# Patient Record
Sex: MALE | Race: WHITE | NOT HISPANIC OR LATINO
[De-identification: names, ages, dates, MRNs, and addresses within clinical notes are randomized per-mention and may not be internally consistent; named-entity substitution may affect disease eponyms.]

---

## 2017-02-23 ENCOUNTER — APPOINTMENT (OUTPATIENT)
Dept: INTERNAL MEDICINE | Facility: CLINIC | Age: 67
End: 2017-02-23

## 2017-02-23 VITALS
HEIGHT: 68 IN | SYSTOLIC BLOOD PRESSURE: 112 MMHG | BODY MASS INDEX: 29.1 KG/M2 | WEIGHT: 192 LBS | OXYGEN SATURATION: 98 % | TEMPERATURE: 98.3 F | HEART RATE: 62 BPM | DIASTOLIC BLOOD PRESSURE: 70 MMHG

## 2017-02-23 DIAGNOSIS — R10.9 UNSPECIFIED ABDOMINAL PAIN: ICD-10-CM

## 2017-03-02 ENCOUNTER — CLINICAL ADVICE (OUTPATIENT)
Age: 67
End: 2017-03-02

## 2017-03-02 LAB
ALBUMIN SERPL ELPH-MCNC: 4.2 G/DL
ALP BLD-CCNC: 68 U/L
ALT SERPL-CCNC: 20 U/L
AMYLASE/CREAT SERPL: 67 U/L
ANION GAP SERPL CALC-SCNC: 16 MMOL/L
AST SERPL-CCNC: 19 U/L
BASOPHILS # BLD AUTO: 0.02 K/UL
BASOPHILS NFR BLD AUTO: 0.3 %
BILIRUB SERPL-MCNC: 0.5 MG/DL
BUN SERPL-MCNC: 12 MG/DL
CALCIUM SERPL-MCNC: 9.5 MG/DL
CHLORIDE SERPL-SCNC: 98 MMOL/L
CO2 SERPL-SCNC: 24 MMOL/L
CREAT SERPL-MCNC: 0.96 MG/DL
EOSINOPHIL # BLD AUTO: 0.11 K/UL
EOSINOPHIL NFR BLD AUTO: 1.6 %
GLUCOSE SERPL-MCNC: 101 MG/DL
HCT VFR BLD CALC: 41.2 %
HGB BLD-MCNC: 13.3 G/DL
IMM GRANULOCYTES NFR BLD AUTO: 0.1 %
LDH SERPL-CCNC: 255 U/L
LYMPHOCYTES # BLD AUTO: 1.98 K/UL
LYMPHOCYTES NFR BLD AUTO: 28 %
MAN DIFF?: NORMAL
MCHC RBC-ENTMCNC: 29.3 PG
MCHC RBC-ENTMCNC: 32.3 GM/DL
MCV RBC AUTO: 90.7 FL
MONOCYTES # BLD AUTO: 0.47 K/UL
MONOCYTES NFR BLD AUTO: 6.6 %
NEUTROPHILS # BLD AUTO: 4.49 K/UL
NEUTROPHILS NFR BLD AUTO: 63.4 %
PLATELET # BLD AUTO: 257 K/UL
POTASSIUM SERPL-SCNC: 4.5 MMOL/L
PROT SERPL-MCNC: 7.3 G/DL
RBC # BLD: 4.54 M/UL
RBC # FLD: 13.5 %
SODIUM SERPL-SCNC: 138 MMOL/L
WBC # FLD AUTO: 7.08 K/UL

## 2017-03-13 ENCOUNTER — RX RENEWAL (OUTPATIENT)
Age: 67
End: 2017-03-13

## 2017-07-20 ENCOUNTER — APPOINTMENT (OUTPATIENT)
Dept: INTERNAL MEDICINE | Facility: CLINIC | Age: 67
End: 2017-07-20

## 2017-07-20 ENCOUNTER — NON-APPOINTMENT (OUTPATIENT)
Age: 67
End: 2017-07-20

## 2017-07-20 VITALS
HEIGHT: 68 IN | TEMPERATURE: 98 F | SYSTOLIC BLOOD PRESSURE: 120 MMHG | WEIGHT: 185 LBS | BODY MASS INDEX: 28.04 KG/M2 | OXYGEN SATURATION: 99 % | DIASTOLIC BLOOD PRESSURE: 70 MMHG | HEART RATE: 89 BPM

## 2017-07-20 DIAGNOSIS — Z01.818 ENCOUNTER FOR OTHER PREPROCEDURAL EXAMINATION: ICD-10-CM

## 2017-07-20 RX ORDER — CEPHALEXIN 500 MG/1
500 CAPSULE ORAL
Qty: 15 | Refills: 0 | Status: DISCONTINUED | COMMUNITY
Start: 2017-07-07

## 2017-07-20 RX ORDER — CLOTRIMAZOLE AND BETAMETHASONE DIPROPIONATE 10; .5 MG/G; MG/G
1-0.05 CREAM TOPICAL
Qty: 15 | Refills: 0 | Status: DISCONTINUED | COMMUNITY
Start: 2017-07-07 | End: 2017-07-20

## 2017-07-20 RX ORDER — CHLORHEXIDINE GLUCONATE, 0.12% ORAL RINSE 1.2 MG/ML
0.12 SOLUTION DENTAL
Qty: 473 | Refills: 0 | Status: DISCONTINUED | COMMUNITY
Start: 2017-05-08 | End: 2017-07-20

## 2017-07-21 ENCOUNTER — APPOINTMENT (OUTPATIENT)
Dept: CARDIOLOGY | Facility: CLINIC | Age: 67
End: 2017-07-21

## 2017-07-21 ENCOUNTER — NON-APPOINTMENT (OUTPATIENT)
Age: 67
End: 2017-07-21

## 2017-07-21 VITALS
BODY MASS INDEX: 28.28 KG/M2 | TEMPERATURE: 97.7 F | HEIGHT: 68 IN | OXYGEN SATURATION: 97 % | WEIGHT: 186.6 LBS | DIASTOLIC BLOOD PRESSURE: 74 MMHG | HEART RATE: 83 BPM | SYSTOLIC BLOOD PRESSURE: 131 MMHG

## 2017-07-21 DIAGNOSIS — H26.9 UNSPECIFIED CATARACT: ICD-10-CM

## 2017-07-24 ENCOUNTER — RX RENEWAL (OUTPATIENT)
Age: 67
End: 2017-07-24

## 2017-08-18 ENCOUNTER — APPOINTMENT (OUTPATIENT)
Dept: CARDIOLOGY | Facility: CLINIC | Age: 67
End: 2017-08-18
Payer: COMMERCIAL

## 2017-08-18 VITALS
SYSTOLIC BLOOD PRESSURE: 140 MMHG | BODY MASS INDEX: 28.04 KG/M2 | HEART RATE: 66 BPM | OXYGEN SATURATION: 98 % | WEIGHT: 185 LBS | DIASTOLIC BLOOD PRESSURE: 80 MMHG | HEIGHT: 68 IN

## 2017-08-18 PROCEDURE — 93306 TTE W/DOPPLER COMPLETE: CPT

## 2017-08-18 PROCEDURE — 93015 CV STRESS TEST SUPVJ I&R: CPT

## 2017-08-18 PROCEDURE — 99214 OFFICE O/P EST MOD 30 MIN: CPT | Mod: 25

## 2017-09-26 ENCOUNTER — APPOINTMENT (OUTPATIENT)
Dept: CARDIOLOGY | Facility: CLINIC | Age: 67
End: 2017-09-26
Payer: COMMERCIAL

## 2017-09-26 ENCOUNTER — NON-APPOINTMENT (OUTPATIENT)
Age: 67
End: 2017-09-26

## 2017-09-26 VITALS
DIASTOLIC BLOOD PRESSURE: 74 MMHG | HEART RATE: 63 BPM | OXYGEN SATURATION: 99 % | BODY MASS INDEX: 28.34 KG/M2 | WEIGHT: 187 LBS | HEIGHT: 68 IN | SYSTOLIC BLOOD PRESSURE: 127 MMHG | TEMPERATURE: 97.4 F

## 2017-09-26 PROCEDURE — 93000 ELECTROCARDIOGRAM COMPLETE: CPT

## 2017-09-26 PROCEDURE — 36415 COLL VENOUS BLD VENIPUNCTURE: CPT

## 2017-09-26 PROCEDURE — 99214 OFFICE O/P EST MOD 30 MIN: CPT

## 2017-09-29 LAB
ALBUMIN SERPL ELPH-MCNC: 4.3 G/DL
ALP BLD-CCNC: 68 U/L
ALT SERPL-CCNC: 18 U/L
ANION GAP SERPL CALC-SCNC: 23 MMOL/L
AST SERPL-CCNC: 23 U/L
BASOPHILS # BLD AUTO: 0.02 K/UL
BASOPHILS NFR BLD AUTO: 0.3 %
BILIRUB SERPL-MCNC: 0.5 MG/DL
BUN SERPL-MCNC: 16 MG/DL
CALCIUM SERPL-MCNC: 9.6 MG/DL
CHLORIDE SERPL-SCNC: 100 MMOL/L
CHOLEST SERPL-MCNC: 207 MG/DL
CHOLEST/HDLC SERPL: 3 RATIO
CO2 SERPL-SCNC: 23 MMOL/L
CREAT SERPL-MCNC: 0.85 MG/DL
EOSINOPHIL # BLD AUTO: 0.14 K/UL
EOSINOPHIL NFR BLD AUTO: 2.1 %
GLUCOSE SERPL-MCNC: 90 MG/DL
HCT VFR BLD CALC: 41.1 %
HDLC SERPL-MCNC: 68 MG/DL
HGB BLD-MCNC: 13.3 G/DL
IMM GRANULOCYTES NFR BLD AUTO: 0.1 %
LDLC SERPL CALC-MCNC: 124 MG/DL
LYMPHOCYTES # BLD AUTO: 1.41 K/UL
LYMPHOCYTES NFR BLD AUTO: 20.8 %
MAN DIFF?: NORMAL
MCHC RBC-ENTMCNC: 29.5 PG
MCHC RBC-ENTMCNC: 32.4 GM/DL
MCV RBC AUTO: 91.1 FL
MONOCYTES # BLD AUTO: 0.54 K/UL
MONOCYTES NFR BLD AUTO: 8 %
NEUTROPHILS # BLD AUTO: 4.66 K/UL
NEUTROPHILS NFR BLD AUTO: 68.7 %
PLATELET # BLD AUTO: 233 K/UL
POTASSIUM SERPL-SCNC: 4.5 MMOL/L
PROT SERPL-MCNC: 7.7 G/DL
RBC # BLD: 4.51 M/UL
RBC # FLD: 14.2 %
SODIUM SERPL-SCNC: 146 MMOL/L
TRIGL SERPL-MCNC: 76 MG/DL
WBC # FLD AUTO: 6.78 K/UL

## 2017-10-15 ENCOUNTER — RX RENEWAL (OUTPATIENT)
Age: 67
End: 2017-10-15

## 2017-11-17 ENCOUNTER — RX RENEWAL (OUTPATIENT)
Age: 67
End: 2017-11-17

## 2017-11-19 ENCOUNTER — RX RENEWAL (OUTPATIENT)
Age: 67
End: 2017-11-19

## 2017-12-01 ENCOUNTER — APPOINTMENT (OUTPATIENT)
Dept: CARDIOLOGY | Facility: CLINIC | Age: 67
End: 2017-12-01
Payer: COMMERCIAL

## 2017-12-01 ENCOUNTER — NON-APPOINTMENT (OUTPATIENT)
Age: 67
End: 2017-12-01

## 2017-12-01 VITALS
TEMPERATURE: 97.5 F | HEART RATE: 70 BPM | DIASTOLIC BLOOD PRESSURE: 69 MMHG | HEIGHT: 68 IN | SYSTOLIC BLOOD PRESSURE: 122 MMHG | OXYGEN SATURATION: 98 % | WEIGHT: 189 LBS | BODY MASS INDEX: 28.64 KG/M2

## 2017-12-01 DIAGNOSIS — R73.09 OTHER ABNORMAL GLUCOSE: ICD-10-CM

## 2017-12-01 PROCEDURE — 99214 OFFICE O/P EST MOD 30 MIN: CPT

## 2017-12-01 PROCEDURE — 93000 ELECTROCARDIOGRAM COMPLETE: CPT

## 2017-12-01 PROCEDURE — 36415 COLL VENOUS BLD VENIPUNCTURE: CPT

## 2017-12-03 ENCOUNTER — NON-APPOINTMENT (OUTPATIENT)
Age: 67
End: 2017-12-03

## 2017-12-04 ENCOUNTER — LABORATORY RESULT (OUTPATIENT)
Age: 67
End: 2017-12-04

## 2017-12-05 ENCOUNTER — APPOINTMENT (OUTPATIENT)
Dept: INTERNAL MEDICINE | Facility: CLINIC | Age: 67
End: 2017-12-05
Payer: COMMERCIAL

## 2017-12-05 VITALS
HEART RATE: 63 BPM | WEIGHT: 189 LBS | DIASTOLIC BLOOD PRESSURE: 60 MMHG | BODY MASS INDEX: 28.64 KG/M2 | OXYGEN SATURATION: 99 % | TEMPERATURE: 97.9 F | HEIGHT: 68 IN | SYSTOLIC BLOOD PRESSURE: 100 MMHG

## 2017-12-05 DIAGNOSIS — N52.9 MALE ERECTILE DYSFUNCTION, UNSPECIFIED: ICD-10-CM

## 2017-12-05 LAB
ALBUMIN SERPL ELPH-MCNC: 4.3 G/DL
ALP BLD-CCNC: 71 U/L
ALT SERPL-CCNC: 21 U/L
ANION GAP SERPL CALC-SCNC: 12 MMOL/L
AST SERPL-CCNC: 20 U/L
BASOPHILS # BLD AUTO: 0.02 K/UL
BASOPHILS NFR BLD AUTO: 0.4 %
BILIRUB SERPL-MCNC: 0.5 MG/DL
BUN SERPL-MCNC: 15 MG/DL
CALCIUM SERPL-MCNC: 9.7 MG/DL
CHLORIDE SERPL-SCNC: 100 MMOL/L
CHOLEST SERPL-MCNC: 214 MG/DL
CHOLEST/HDLC SERPL: 3 RATIO
CO2 SERPL-SCNC: 28 MMOL/L
CREAT SERPL-MCNC: 1.02 MG/DL
EOSINOPHIL # BLD AUTO: 0.17 K/UL
EOSINOPHIL NFR BLD AUTO: 3.2 %
GLUCOSE SERPL-MCNC: 84 MG/DL
HBA1C MFR BLD HPLC: 5.6 %
HCT VFR BLD CALC: 40.5 %
HDLC SERPL-MCNC: 72 MG/DL
HGB BLD-MCNC: 13.3 G/DL
IMM GRANULOCYTES NFR BLD AUTO: 0.2 %
LDLC SERPL CALC-MCNC: 127 MG/DL
LYMPHOCYTES # BLD AUTO: 1.69 K/UL
LYMPHOCYTES NFR BLD AUTO: 31.9 %
MAN DIFF?: NORMAL
MCHC RBC-ENTMCNC: 29.8 PG
MCHC RBC-ENTMCNC: 32.8 GM/DL
MCV RBC AUTO: 90.8 FL
MONOCYTES # BLD AUTO: 0.51 K/UL
MONOCYTES NFR BLD AUTO: 9.6 %
NEUTROPHILS # BLD AUTO: 2.89 K/UL
NEUTROPHILS NFR BLD AUTO: 54.7 %
PLATELET # BLD AUTO: 229 K/UL
POTASSIUM SERPL-SCNC: 5 MMOL/L
PROT SERPL-MCNC: 7.5 G/DL
RBC # BLD: 4.46 M/UL
RBC # FLD: 13.6 %
SODIUM SERPL-SCNC: 140 MMOL/L
TRIGL SERPL-MCNC: 75 MG/DL
WBC # FLD AUTO: 5.29 K/UL

## 2017-12-05 PROCEDURE — 99213 OFFICE O/P EST LOW 20 MIN: CPT | Mod: 25

## 2017-12-05 PROCEDURE — 90662 IIV NO PRSV INCREASED AG IM: CPT

## 2017-12-05 PROCEDURE — G0008: CPT

## 2017-12-05 PROCEDURE — G0439: CPT

## 2017-12-05 PROCEDURE — 36415 COLL VENOUS BLD VENIPUNCTURE: CPT

## 2017-12-07 LAB
ALBUMIN SERPL ELPH-MCNC: 4.4 G/DL
ALP BLD-CCNC: 68 U/L
ALT SERPL-CCNC: 22 U/L
ANION GAP SERPL CALC-SCNC: 16 MMOL/L
APPEARANCE: CLEAR
AST SERPL-CCNC: 24 U/L
BACTERIA: NEGATIVE
BILIRUB SERPL-MCNC: 0.4 MG/DL
BILIRUBIN URINE: NEGATIVE
BLOOD URINE: NEGATIVE
BUN SERPL-MCNC: 14 MG/DL
CALCIUM SERPL-MCNC: 9.7 MG/DL
CHLORIDE SERPL-SCNC: 96 MMOL/L
CHOLEST SERPL-MCNC: 196 MG/DL
CHOLEST/HDLC SERPL: 3.3 RATIO
CO2 SERPL-SCNC: 26 MMOL/L
COLOR: YELLOW
CREAT SERPL-MCNC: 0.85 MG/DL
GLUCOSE QUALITATIVE U: NEGATIVE MG/DL
GLUCOSE SERPL-MCNC: 105 MG/DL
HDLC SERPL-MCNC: 60 MG/DL
KETONES URINE: NEGATIVE
LDLC SERPL CALC-MCNC: 120 MG/DL
LEUKOCYTE ESTERASE URINE: NEGATIVE
MICROSCOPIC-UA: NORMAL
NITRITE URINE: NEGATIVE
PH URINE: 7.5
POTASSIUM SERPL-SCNC: 4.8 MMOL/L
PROT SERPL-MCNC: 7.9 G/DL
PROTEIN URINE: NEGATIVE MG/DL
PSA SERPL-MCNC: 2.69 NG/ML
RED BLOOD CELLS URINE: 1 /HPF
SODIUM SERPL-SCNC: 138 MMOL/L
SPECIFIC GRAVITY URINE: 1.01
SQUAMOUS EPITHELIAL CELLS: 0 /HPF
T3RU NFR SERPL: 1.1 INDEX
T4 SERPL-MCNC: 7.4 UG/DL
TRIGL SERPL-MCNC: 81 MG/DL
TSH SERPL-ACNC: 3.53 UIU/ML
UROBILINOGEN URINE: NEGATIVE MG/DL
WHITE BLOOD CELLS URINE: 0 /HPF

## 2017-12-18 ENCOUNTER — RX RENEWAL (OUTPATIENT)
Age: 67
End: 2017-12-18

## 2018-01-13 ENCOUNTER — RX RENEWAL (OUTPATIENT)
Age: 68
End: 2018-01-13

## 2018-01-15 ENCOUNTER — RX RENEWAL (OUTPATIENT)
Age: 68
End: 2018-01-15

## 2018-02-21 ENCOUNTER — RX RENEWAL (OUTPATIENT)
Age: 68
End: 2018-02-21

## 2018-02-23 ENCOUNTER — RX RENEWAL (OUTPATIENT)
Age: 68
End: 2018-02-23

## 2018-03-20 ENCOUNTER — RX RENEWAL (OUTPATIENT)
Age: 68
End: 2018-03-20

## 2018-04-16 ENCOUNTER — RX RENEWAL (OUTPATIENT)
Age: 68
End: 2018-04-16

## 2018-05-16 ENCOUNTER — RX RENEWAL (OUTPATIENT)
Age: 68
End: 2018-05-16

## 2018-06-01 ENCOUNTER — NON-APPOINTMENT (OUTPATIENT)
Age: 68
End: 2018-06-01

## 2018-06-01 ENCOUNTER — APPOINTMENT (OUTPATIENT)
Dept: CARDIOLOGY | Facility: CLINIC | Age: 68
End: 2018-06-01
Payer: COMMERCIAL

## 2018-06-01 VITALS
WEIGHT: 190 LBS | BODY MASS INDEX: 28.79 KG/M2 | HEART RATE: 60 BPM | TEMPERATURE: 98.3 F | OXYGEN SATURATION: 98 % | SYSTOLIC BLOOD PRESSURE: 134 MMHG | HEIGHT: 68 IN | DIASTOLIC BLOOD PRESSURE: 71 MMHG

## 2018-06-01 VITALS
DIASTOLIC BLOOD PRESSURE: 70 MMHG | BODY MASS INDEX: 29.01 KG/M2 | HEART RATE: 52 BPM | WEIGHT: 190.8 LBS | SYSTOLIC BLOOD PRESSURE: 140 MMHG

## 2018-06-01 PROCEDURE — 93000 ELECTROCARDIOGRAM COMPLETE: CPT

## 2018-06-01 PROCEDURE — 36415 COLL VENOUS BLD VENIPUNCTURE: CPT

## 2018-06-01 PROCEDURE — 99214 OFFICE O/P EST MOD 30 MIN: CPT

## 2018-06-06 LAB
ALBUMIN SERPL ELPH-MCNC: 4.5 G/DL
ALP BLD-CCNC: 76 U/L
ALT SERPL-CCNC: 23 U/L
ANION GAP SERPL CALC-SCNC: 10 MMOL/L
AST SERPL-CCNC: 20 U/L
BASOPHILS # BLD AUTO: 0.02 K/UL
BASOPHILS NFR BLD AUTO: 0.4 %
BILIRUB SERPL-MCNC: 0.3 MG/DL
BUN SERPL-MCNC: 20 MG/DL
CALCIUM SERPL-MCNC: 9.6 MG/DL
CHLORIDE SERPL-SCNC: 101 MMOL/L
CHOLEST SERPL-MCNC: 183 MG/DL
CHOLEST/HDLC SERPL: 3.3 RATIO
CO2 SERPL-SCNC: 28 MMOL/L
CREAT SERPL-MCNC: 1.03 MG/DL
EOSINOPHIL # BLD AUTO: 0.09 K/UL
EOSINOPHIL NFR BLD AUTO: 1.6 %
GLUCOSE SERPL-MCNC: 97 MG/DL
HBA1C MFR BLD HPLC: 5.4 %
HCT VFR BLD CALC: 41.3 %
HDLC SERPL-MCNC: 56 MG/DL
HGB BLD-MCNC: 13.5 G/DL
IMM GRANULOCYTES NFR BLD AUTO: 0.2 %
LDLC SERPL CALC-MCNC: 106 MG/DL
LYMPHOCYTES # BLD AUTO: 1.7 K/UL
LYMPHOCYTES NFR BLD AUTO: 31 %
MAN DIFF?: NORMAL
MCHC RBC-ENTMCNC: 29.5 PG
MCHC RBC-ENTMCNC: 32.7 GM/DL
MCV RBC AUTO: 90.2 FL
MONOCYTES # BLD AUTO: 0.8 K/UL
MONOCYTES NFR BLD AUTO: 14.6 %
NEUTROPHILS # BLD AUTO: 2.87 K/UL
NEUTROPHILS NFR BLD AUTO: 52.2 %
NT-PROBNP SERPL-MCNC: 74 PG/ML
PLATELET # BLD AUTO: 223 K/UL
POTASSIUM SERPL-SCNC: 5.4 MMOL/L
PROT SERPL-MCNC: 7.4 G/DL
RBC # BLD: 4.58 M/UL
RBC # FLD: 13.2 %
SODIUM SERPL-SCNC: 139 MMOL/L
TRIGL SERPL-MCNC: 103 MG/DL
WBC # FLD AUTO: 5.49 K/UL

## 2018-06-19 ENCOUNTER — RX RENEWAL (OUTPATIENT)
Age: 68
End: 2018-06-19

## 2018-06-19 ENCOUNTER — MEDICATION RENEWAL (OUTPATIENT)
Age: 68
End: 2018-06-19

## 2018-06-28 ENCOUNTER — RX RENEWAL (OUTPATIENT)
Age: 68
End: 2018-06-28

## 2018-07-16 ENCOUNTER — APPOINTMENT (OUTPATIENT)
Dept: INTERNAL MEDICINE | Facility: CLINIC | Age: 68
End: 2018-07-16
Payer: COMMERCIAL

## 2018-07-16 VITALS
WEIGHT: 185 LBS | HEIGHT: 67.5 IN | BODY MASS INDEX: 28.7 KG/M2 | OXYGEN SATURATION: 98 % | DIASTOLIC BLOOD PRESSURE: 70 MMHG | TEMPERATURE: 98.1 F | SYSTOLIC BLOOD PRESSURE: 128 MMHG | HEART RATE: 70 BPM

## 2018-07-16 DIAGNOSIS — N52.9 MALE ERECTILE DYSFUNCTION, UNSPECIFIED: ICD-10-CM

## 2018-07-16 PROCEDURE — 99214 OFFICE O/P EST MOD 30 MIN: CPT

## 2018-07-16 RX ORDER — DOXYCYCLINE HYCLATE 20 MG/1
20 TABLET ORAL
Qty: 60 | Refills: 0 | Status: DISCONTINUED | COMMUNITY
Start: 2017-06-05 | End: 2018-07-16

## 2018-07-16 RX ORDER — PANTOPRAZOLE 40 MG/1
40 TABLET, DELAYED RELEASE ORAL DAILY
Qty: 30 | Refills: 0 | Status: DISCONTINUED | COMMUNITY
Start: 2017-02-23 | End: 2018-07-16

## 2018-07-16 RX ORDER — BRINZOLAMIDE/BRIMONIDINE TARTRATE 10; 2 MG/ML; MG/ML
1-0.2 SUSPENSION/ DROPS OPHTHALMIC
Qty: 8 | Refills: 0 | Status: DISCONTINUED | COMMUNITY
Start: 2017-10-27 | End: 2018-07-16

## 2018-07-17 NOTE — REVIEW OF SYSTEMS
[Hesitancy] : hesitancy [Nocturia] : nocturia [Negative] : Heme/Lymph [Dysuria] : no dysuria [Incontinence] : no incontinence [Hematuria] : no hematuria

## 2018-07-17 NOTE — ASSESSMENT
[FreeTextEntry1] : discussed w pt \par \par reviewed current rx, cont as prescribed\par no complications on anticoagulation and remains in NSR, following w Dr Henderson \par \par BPH symptoms unchanged, discussed rx, he would like to cont current rx and not add any new rx at this time\par \par discussed ED symptoms. reviewed rx options. will start trial of sildenafil prn. discussed potential SEs \par \par RTO ~ 6 months for full physical or earlier prn if any new concerns

## 2018-07-17 NOTE — HISTORY OF PRESENT ILLNESS
[de-identified] : presents for f/u visit w new internist as Dr Smalls has recently retired. he feels well overall currently w no new concerns. \par \par chronic BPH symptoms w nocturia on terazosin, mildly bothersome . he does not want to add new rx to regimen \par afib diagnosed 1 year, now in NSR, continues on anticoagulation w Xarelto wout complications and rhythm controlled on flecainide, following w Dr Henderson cardiology\par \par he would like to discuss ED symptoms which are slowly worsening over past few months

## 2018-07-17 NOTE — PHYSICAL EXAM
[No Acute Distress] : no acute distress [Well-Appearing] : well-appearing [Normal Voice/Communication] : normal voice/communication [No JVD] : no jugular venous distention [Supple] : supple [No Respiratory Distress] : no respiratory distress  [Clear to Auscultation] : lungs were clear to auscultation bilaterally [No Accessory Muscle Use] : no accessory muscle use [Normal Rate] : normal rate  [Regular Rhythm] : with a regular rhythm [Normal S1, S2] : normal S1 and S2 [No Murmur] : no murmur heard [No Carotid Bruits] : no carotid bruits [No Edema] : there was no peripheral edema [Normal Supraclavicular Nodes] : no supraclavicular lymphadenopathy [Normal Posterior Cervical Nodes] : no posterior cervical lymphadenopathy [Normal Anterior Cervical Nodes] : no anterior cervical lymphadenopathy [Grossly Normal Strength/Tone] : grossly normal strength/tone [Normal Gait] : normal gait [Normal Affect] : the affect was normal [Normal Mood] : the mood was normal [Normal Insight/Judgement] : insight and judgment were intact

## 2018-07-25 ENCOUNTER — MEDICATION RENEWAL (OUTPATIENT)
Age: 68
End: 2018-07-25

## 2018-07-27 ENCOUNTER — RX RENEWAL (OUTPATIENT)
Age: 68
End: 2018-07-27

## 2018-08-31 ENCOUNTER — RX RENEWAL (OUTPATIENT)
Age: 68
End: 2018-08-31

## 2018-10-02 ENCOUNTER — RX RENEWAL (OUTPATIENT)
Age: 68
End: 2018-10-02

## 2018-11-20 ENCOUNTER — RX RENEWAL (OUTPATIENT)
Age: 68
End: 2018-11-20

## 2018-12-13 ENCOUNTER — NON-APPOINTMENT (OUTPATIENT)
Age: 68
End: 2018-12-13

## 2018-12-13 ENCOUNTER — APPOINTMENT (OUTPATIENT)
Dept: CARDIOLOGY | Facility: CLINIC | Age: 68
End: 2018-12-13
Payer: COMMERCIAL

## 2018-12-13 VITALS
SYSTOLIC BLOOD PRESSURE: 138 MMHG | BODY MASS INDEX: 28.54 KG/M2 | TEMPERATURE: 98.4 F | HEART RATE: 69 BPM | OXYGEN SATURATION: 99 % | WEIGHT: 184 LBS | DIASTOLIC BLOOD PRESSURE: 72 MMHG | HEIGHT: 67.5 IN

## 2018-12-13 PROCEDURE — 93000 ELECTROCARDIOGRAM COMPLETE: CPT

## 2018-12-13 PROCEDURE — 36415 COLL VENOUS BLD VENIPUNCTURE: CPT

## 2018-12-13 PROCEDURE — 99214 OFFICE O/P EST MOD 30 MIN: CPT

## 2018-12-13 NOTE — CARDIOLOGY SUMMARY
[No Ischemia] : no Ischemia [___] : [unfilled] [LVEF ___%] : LVEF [unfilled]% [Normal] : normal LA size [None] : no mitral regurgitation

## 2018-12-13 NOTE — HISTORY OF PRESENT ILLNESS
[FreeTextEntry1] : November 20, 2014. Patient has been pretty healthy for most of his life. For the past year or so he notes that if he exerts himself he gets short of breath especially if strenuous. He feels like he has to stop but he does not have any chest pain. If he is walking flat he does fine but any incline or anything heavy like shoveling causes shortness of breath and he has to stop. His risk factor for coronary artery disease seems to be a family history with a father who had a stent and probably bypass surgery. His hemoglobin A1c recently was 5.8. His lipids a year ago had mildly elevated LDL and HDL in the 50s. No hypertension and no cigarette smoking. Past medical history is otherwise unremarkable. He has had no surgeries and his only hospitalization was in college for either hepatitis or mononucleosis. His wife mentions that he does snore a lot but she does not describe apneic periods. He has very mild GERD. He has never had a stress test, pulmonary function tests etc.\par December 9, 2014. Patient return for a stress echocardiogram. Exercised for 8 minutes. The target heart rate of 150 for no chest pain or significant shortness of breath. No ST changes. Echocardiogram totally normal with no evidence of ischemia.\par July 21, 2017. First visit in almost 3 years. Patient has been doing well in the interim continuing to have the same symptom of occasional shortness of breath when he is walking, stops and it goes away, and then he can continue. No exertional chest pain. No change in intensity or frequency compared with 3 years ago, when he had a normal stress echo. He was scheduled for routine preop for cataract surgery and was found to be in atrial fibrillation yesterday. No symptoms of palpitations or any change in aerobic capacity. Rate seems to be controlled without medication. He is here now for evaluation of this new problem.\par August 18, 2017. Patient returns for followup along with stress test and echocardiogram. His echocardiogram is unremarkable except for mild MR. Normal LV and RV function. His plain stress test had no ischemia, but very frequent APCs and short bursts of 4-6 beat atrial tachycardia in recovery, but no atrial fibrillation. The patient Is back in sinus rhythm. His atrial fibrillation is mostly from conduction disease with maybe some component from his MR given that he has normal LV function and no ischemia. We have multiple choices. Given his underlying conduction disease he may have more problems with sotalol then with flecainide, so we will try flecainide at only 50 mg q.12 h. Long discussion about the pros and cons of eventually coming off anticoagulation. He will followup in one month and we will reevaluate.\par September 26, 2017. Patient returns now on flecainide 50 mg q.12 h. Is in sinus bradycardia at 56 with an otherwise normal ECG. He feels fine, with no complaints.\par December 1, 2017. Patient is here in followup. He feels wonderful without complaints. Is in sinus rhythm at 63, with a normal QRS, and QT, and no side effects. He will be seeing Dr. Smalls that next week for a checkup and a flu shot\par June 1, 2018. Patient returns in followup. Remains in sinus bradycardia at 52. Tolerating the flecainide. Tolerating the Xarelto with no complaints.\par December 13, 2018. Patient returns in followup. He remains in sinus bradycardia at 56 with an otherwise normal. EKG. Will be retiring soon and has a trip planned to [place] in February. No symptoms. No change in medication. No complaints

## 2018-12-13 NOTE — REVIEW OF SYSTEMS
[see HPI] : see HPI [Negative] : Heme/Lymph [Recent Weight Gain (___ Lbs)] : no recent weight gain [Feeling Fatigued] : not feeling fatigued [Recent Weight Loss (___ Lbs)] : no recent weight loss [Shortness Of Breath] : no shortness of breath [Dyspnea on exertion] : not dyspnea during exertion [Chest  Pressure] : no chest pressure [Chest Pain] : no chest pain [Lower Ext Edema] : no extremity edema [Leg Claudication] : no intermittent leg claudication [Palpitations] : no palpitations [Cough] : no cough [Wheezing] : no wheezing [Heartburn] : no heartburn [Change in Appetite] : no change in appetite [Change In The Stool] : no change in stool [Dizziness] : no dizziness [Anxiety] : no anxiety [Under Stress] : not under stress

## 2018-12-13 NOTE — PHYSICAL EXAM
[General Appearance - Well Developed] : well developed [Normal Appearance] : normal appearance [Well Groomed] : well groomed [General Appearance - Well Nourished] : well nourished [No Deformities] : no deformities [General Appearance - In No Acute Distress] : no acute distress [Normal Conjunctiva] : the conjunctiva exhibited no abnormalities [Eyelids - No Xanthelasma] : the eyelids demonstrated no xanthelasmas [Normal Oral Mucosa] : normal oral mucosa [No Oral Pallor] : no oral pallor [No Oral Cyanosis] : no oral cyanosis [Normal Jugular Venous A Waves Present] : normal jugular venous A waves present [Normal Jugular Venous V Waves Present] : normal jugular venous V waves present [No Jugular Venous Lehman A Waves] : no jugular venous lehman A waves [Respiration, Rhythm And Depth] : normal respiratory rhythm and effort [Exaggerated Use Of Accessory Muscles For Inspiration] : no accessory muscle use [Auscultation Breath Sounds / Voice Sounds] : lungs were clear to auscultation bilaterally [Heart Rate And Rhythm] : heart rate and rhythm were normal [Heart Sounds] : normal S1 and S2 [Murmurs] : no murmurs present [Abdomen Soft] : soft [Abdomen Tenderness] : non-tender [Abdomen Mass (___ Cm)] : no abdominal mass palpated [Abnormal Walk] : normal gait [Gait - Sufficient For Exercise Testing] : the gait was sufficient for exercise testing [Nail Clubbing] : no clubbing of the fingernails [Cyanosis, Localized] : no localized cyanosis [Petechial Hemorrhages (___cm)] : no petechial hemorrhages [Skin Color & Pigmentation] : normal skin color and pigmentation [] : no rash [No Venous Stasis] : no venous stasis [Skin Lesions] : no skin lesions [No Skin Ulcers] : no skin ulcer [No Xanthoma] : no  xanthoma was observed [Oriented To Time, Place, And Person] : oriented to person, place, and time [Affect] : the affect was normal [Mood] : the mood was normal [No Anxiety] : not feeling anxious [FreeTextEntry1] : No edema. Pulses 2+ bilaterally symmetrical including pedal

## 2018-12-17 LAB
ALBUMIN SERPL ELPH-MCNC: 4.6 G/DL
ALP BLD-CCNC: 67 U/L
ALT SERPL-CCNC: 22 U/L
ANION GAP SERPL CALC-SCNC: 11 MMOL/L
AST SERPL-CCNC: 25 U/L
BASOPHILS # BLD AUTO: 0.01 K/UL
BASOPHILS NFR BLD AUTO: 0.2 %
BILIRUB SERPL-MCNC: 0.4 MG/DL
BUN SERPL-MCNC: 15 MG/DL
CALCIUM SERPL-MCNC: 9.8 MG/DL
CHLORIDE SERPL-SCNC: 99 MMOL/L
CHOLEST SERPL-MCNC: 182 MG/DL
CHOLEST/HDLC SERPL: 2.9 RATIO
CO2 SERPL-SCNC: 26 MMOL/L
CREAT SERPL-MCNC: 0.86 MG/DL
EOSINOPHIL # BLD AUTO: 0.14 K/UL
EOSINOPHIL NFR BLD AUTO: 2.8 %
GLUCOSE SERPL-MCNC: 99 MG/DL
HBA1C MFR BLD HPLC: 5.4 %
HCT VFR BLD CALC: 39.8 %
HDLC SERPL-MCNC: 62 MG/DL
HGB BLD-MCNC: 13.1 G/DL
IMM GRANULOCYTES NFR BLD AUTO: 0 %
LDLC SERPL CALC-MCNC: 102 MG/DL
LYMPHOCYTES # BLD AUTO: 1.7 K/UL
LYMPHOCYTES NFR BLD AUTO: 33.7 %
MAN DIFF?: NORMAL
MCHC RBC-ENTMCNC: 29.4 PG
MCHC RBC-ENTMCNC: 32.9 GM/DL
MCV RBC AUTO: 89.2 FL
MONOCYTES # BLD AUTO: 0.49 K/UL
MONOCYTES NFR BLD AUTO: 9.7 %
NEUTROPHILS # BLD AUTO: 2.71 K/UL
NEUTROPHILS NFR BLD AUTO: 53.6 %
NT-PROBNP SERPL-MCNC: 44 PG/ML
PLATELET # BLD AUTO: 239 K/UL
POTASSIUM SERPL-SCNC: 4.7 MMOL/L
PROT SERPL-MCNC: 7.1 G/DL
RBC # BLD: 4.46 M/UL
RBC # FLD: 13.2 %
SODIUM SERPL-SCNC: 136 MMOL/L
TRIGL SERPL-MCNC: 88 MG/DL
TSH SERPL-ACNC: 2.43 UIU/ML
WBC # FLD AUTO: 5.05 K/UL

## 2018-12-20 ENCOUNTER — MEDICATION RENEWAL (OUTPATIENT)
Age: 68
End: 2018-12-20

## 2019-01-16 ENCOUNTER — MEDICATION RENEWAL (OUTPATIENT)
Age: 69
End: 2019-01-16

## 2019-01-24 ENCOUNTER — APPOINTMENT (OUTPATIENT)
Dept: INTERNAL MEDICINE | Facility: CLINIC | Age: 69
End: 2019-01-24
Payer: COMMERCIAL

## 2019-01-24 VITALS
BODY MASS INDEX: 28.19 KG/M2 | WEIGHT: 186 LBS | TEMPERATURE: 98 F | SYSTOLIC BLOOD PRESSURE: 120 MMHG | HEART RATE: 68 BPM | DIASTOLIC BLOOD PRESSURE: 60 MMHG | OXYGEN SATURATION: 97 % | HEIGHT: 68 IN

## 2019-01-24 PROCEDURE — 99397 PER PM REEVAL EST PAT 65+ YR: CPT | Mod: 25

## 2019-01-24 PROCEDURE — G0444 DEPRESSION SCREEN ANNUAL: CPT

## 2019-01-24 NOTE — REVIEW OF SYSTEMS
[Hesitancy] : hesitancy [Nocturia] : nocturia [Frequency] : frequency [Negative] : Heme/Lymph [Dysuria] : no dysuria [Incontinence] : no incontinence [Hematuria] : no hematuria

## 2019-01-24 NOTE — ASSESSMENT
[FreeTextEntry1] : discussed w pt \par \par reviewed recent labs from last month w Dr Henderson and EKG, most recent echo. \par will check routine UA and ordered PSA to be done w next lab draw in few months \par \par cont current rx \par \par he declines to try new rx for BPH symptoms or see urology, cont to monitor symptoms of BPH \par \par reviewed vaccines, UTD except new Shingrix vaccine which is not available currently \par \par will obtain most recent colonoscopy report \par \par f/u w Dr Henderson as scheduled \par \par RTO yearly for routine exam or earlier prn if any new concerns

## 2019-01-24 NOTE — HISTORY OF PRESENT ILLNESS
[de-identified] : 70 y/o man presents for annual physical exam. he feels well overall currently w no new concerns. \par \par afib rate/rhythm controlled on flecainide, following w Dr Henderson, continues on Xarelto for anticoagulation without problems \par chronic BPH symptoms on terazosin for years, mildly bothersome symptoms but he declines to proceed w further tx \par \par last colonoscopy possibly 2017 w Dr Dutta, pt recalls normal results

## 2019-01-24 NOTE — PHYSICAL EXAM
[No Acute Distress] : no acute distress [Well Nourished] : well nourished [Well Developed] : well developed [Well-Appearing] : well-appearing [Normal Voice/Communication] : normal voice/communication [Normal Sclera/Conjunctiva] : normal sclera/conjunctiva [PERRL] : pupils equal round and reactive to light [Normal Outer Ear/Nose] : the outer ears and nose were normal in appearance [Normal Oropharynx] : the oropharynx was normal [Normal TMs] : both tympanic membranes were normal [No JVD] : no jugular venous distention [Supple] : supple [No Lymphadenopathy] : no lymphadenopathy [Thyroid Normal, No Nodules] : the thyroid was normal and there were no nodules present [No Respiratory Distress] : no respiratory distress  [Clear to Auscultation] : lungs were clear to auscultation bilaterally [No Accessory Muscle Use] : no accessory muscle use [Normal Rate] : normal rate  [Regular Rhythm] : with a regular rhythm [Normal S1, S2] : normal S1 and S2 [No Murmur] : no murmur heard [No Carotid Bruits] : no carotid bruits [No Abdominal Bruit] : a ~M bruit was not heard ~T in the abdomen [No Varicosities] : no varicosities [Pedal Pulses Present] : the pedal pulses are present [No Edema] : there was no peripheral edema [No Extremity Clubbing/Cyanosis] : no extremity clubbing/cyanosis [Soft] : abdomen soft [Non Tender] : non-tender [Non-distended] : non-distended [No Masses] : no abdominal mass palpated [No HSM] : no HSM [Normal Bowel Sounds] : normal bowel sounds [Normal Sphincter Tone] : normal sphincter tone [No Mass] : no mass [Prostate Tenderness] : the prostate was not tender [No Prostate Nodules] : no prostate nodules [Normal Supraclavicular Nodes] : no supraclavicular lymphadenopathy [Normal Posterior Cervical Nodes] : no posterior cervical lymphadenopathy [Normal Anterior Cervical Nodes] : no anterior cervical lymphadenopathy [No Spinal Tenderness] : no spinal tenderness [No Joint Swelling] : no joint swelling [Grossly Normal Strength/Tone] : grossly normal strength/tone [No Rash] : no rash [Normal Gait] : normal gait [Coordination Grossly Intact] : coordination grossly intact [No Focal Deficits] : no focal deficits [Speech Grossly Normal] : speech grossly normal [Normal Affect] : the affect was normal [Normal Mood] : the mood was normal [Normal Insight/Judgement] : insight and judgment were intact [FreeTextEntry1] : prostate enlarged

## 2019-01-25 LAB
APPEARANCE: CLEAR
BILIRUBIN URINE: NEGATIVE
BLOOD URINE: NEGATIVE
COLOR: YELLOW
GLUCOSE QUALITATIVE U: NEGATIVE MG/DL
KETONES URINE: NEGATIVE
LEUKOCYTE ESTERASE URINE: NEGATIVE
NITRITE URINE: NEGATIVE
PH URINE: 6.5
PROTEIN URINE: NEGATIVE MG/DL
SPECIFIC GRAVITY URINE: 1.02
UROBILINOGEN URINE: NEGATIVE MG/DL

## 2019-04-12 ENCOUNTER — CLINICAL ADVICE (OUTPATIENT)
Age: 69
End: 2019-04-12

## 2019-04-23 ENCOUNTER — CLINICAL ADVICE (OUTPATIENT)
Age: 69
End: 2019-04-23

## 2019-04-25 ENCOUNTER — NON-APPOINTMENT (OUTPATIENT)
Age: 69
End: 2019-04-25

## 2019-04-25 ENCOUNTER — APPOINTMENT (OUTPATIENT)
Dept: CARDIOLOGY | Facility: CLINIC | Age: 69
End: 2019-04-25
Payer: MEDICARE

## 2019-04-25 VITALS
WEIGHT: 184 LBS | BODY MASS INDEX: 27.89 KG/M2 | HEIGHT: 68 IN | SYSTOLIC BLOOD PRESSURE: 145 MMHG | DIASTOLIC BLOOD PRESSURE: 71 MMHG | OXYGEN SATURATION: 97 % | HEART RATE: 63 BPM | TEMPERATURE: 98 F

## 2019-04-25 VITALS — DIASTOLIC BLOOD PRESSURE: 68 MMHG | HEART RATE: 56 BPM | SYSTOLIC BLOOD PRESSURE: 142 MMHG

## 2019-04-25 PROCEDURE — 99214 OFFICE O/P EST MOD 30 MIN: CPT

## 2019-04-25 PROCEDURE — 93000 ELECTROCARDIOGRAM COMPLETE: CPT

## 2019-04-25 PROCEDURE — 36415 COLL VENOUS BLD VENIPUNCTURE: CPT

## 2019-04-29 LAB
ALBUMIN SERPL ELPH-MCNC: 4.4 G/DL
ALP BLD-CCNC: 67 U/L
ALT SERPL-CCNC: 15 U/L
ANION GAP SERPL CALC-SCNC: 11 MMOL/L
AST SERPL-CCNC: 17 U/L
BASOPHILS # BLD AUTO: 0.03 K/UL
BASOPHILS NFR BLD AUTO: 0.5 %
BILIRUB SERPL-MCNC: 0.4 MG/DL
BUN SERPL-MCNC: 15 MG/DL
CALCIUM SERPL-MCNC: 9.4 MG/DL
CHLORIDE SERPL-SCNC: 101 MMOL/L
CHOLEST SERPL-MCNC: 183 MG/DL
CHOLEST/HDLC SERPL: 2.9 RATIO
CO2 SERPL-SCNC: 25 MMOL/L
CREAT SERPL-MCNC: 0.93 MG/DL
EOSINOPHIL # BLD AUTO: 0.11 K/UL
EOSINOPHIL NFR BLD AUTO: 1.9 %
ESTIMATED AVERAGE GLUCOSE: 111 MG/DL
GLUCOSE SERPL-MCNC: 94 MG/DL
HBA1C MFR BLD HPLC: 5.5 %
HCT VFR BLD CALC: 39.5 %
HDLC SERPL-MCNC: 63 MG/DL
HGB BLD-MCNC: 12.8 G/DL
IMM GRANULOCYTES NFR BLD AUTO: 0.2 %
LDLC SERPL CALC-MCNC: 106 MG/DL
LYMPHOCYTES # BLD AUTO: 1.62 K/UL
LYMPHOCYTES NFR BLD AUTO: 28 %
MAN DIFF?: NORMAL
MCHC RBC-ENTMCNC: 29.6 PG
MCHC RBC-ENTMCNC: 32.4 GM/DL
MCV RBC AUTO: 91.4 FL
MONOCYTES # BLD AUTO: 0.58 K/UL
MONOCYTES NFR BLD AUTO: 10 %
NEUTROPHILS # BLD AUTO: 3.44 K/UL
NEUTROPHILS NFR BLD AUTO: 59.4 %
NT-PROBNP SERPL-MCNC: 29 PG/ML
PLATELET # BLD AUTO: 213 K/UL
POTASSIUM SERPL-SCNC: 4.8 MMOL/L
PROT SERPL-MCNC: 6.8 G/DL
RBC # BLD: 4.32 M/UL
RBC # FLD: 12.8 %
SODIUM SERPL-SCNC: 137 MMOL/L
TRIGL SERPL-MCNC: 72 MG/DL
WBC # FLD AUTO: 5.79 K/UL

## 2019-06-18 ENCOUNTER — MEDICATION RENEWAL (OUTPATIENT)
Age: 69
End: 2019-06-18

## 2019-07-09 ENCOUNTER — RX RENEWAL (OUTPATIENT)
Age: 69
End: 2019-07-09

## 2019-08-16 ENCOUNTER — RECORD ABSTRACTING (OUTPATIENT)
Age: 69
End: 2019-08-16

## 2019-08-16 DIAGNOSIS — R06.02 SHORTNESS OF BREATH: ICD-10-CM

## 2019-08-28 ENCOUNTER — APPOINTMENT (OUTPATIENT)
Dept: CARDIOLOGY | Facility: CLINIC | Age: 69
End: 2019-08-28
Payer: MEDICARE

## 2019-08-28 PROCEDURE — 93325 DOPPLER ECHO COLOR FLOW MAPG: CPT

## 2019-08-28 PROCEDURE — 93320 DOPPLER ECHO COMPLETE: CPT

## 2019-08-28 PROCEDURE — 93351 STRESS TTE COMPLETE: CPT

## 2019-08-28 NOTE — REVIEW OF SYSTEMS
[see HPI] : see HPI [Negative] : Heme/Lymph [Recent Weight Gain (___ Lbs)] : no recent weight gain [Feeling Fatigued] : not feeling fatigued [Recent Weight Loss (___ Lbs)] : no recent weight loss [Shortness Of Breath] : no shortness of breath [Dyspnea on exertion] : not dyspnea during exertion [Chest  Pressure] : no chest pressure [Chest Pain] : no chest pain [Lower Ext Edema] : no extremity edema [Palpitations] : no palpitations [Leg Claudication] : no intermittent leg claudication [Cough] : no cough [Wheezing] : no wheezing [Heartburn] : no heartburn [Change in Appetite] : no change in appetite [Change In The Stool] : no change in stool [Dizziness] : no dizziness [Anxiety] : no anxiety [Under Stress] : not under stress

## 2019-08-28 NOTE — HISTORY OF PRESENT ILLNESS
[FreeTextEntry1] : November 20, 2014. Patient has been pretty healthy for most of his life. For the past year or so he notes that if he exerts himself he gets short of breath especially if strenuous. He feels like he has to stop but he does not have any chest pain. If he is walking flat he does fine but any incline or anything heavy like shoveling causes shortness of breath and he has to stop. His risk factor for coronary artery disease seems to be a family history with a father who had a stent and probably bypass surgery. His hemoglobin A1c recently was 5.8. His lipids a year ago had mildly elevated LDL and HDL in the 50s. No hypertension and no cigarette smoking. Past medical history is otherwise unremarkable. He has had no surgeries and his only hospitalization was in college for either hepatitis or mononucleosis. His wife mentions that he does snore a lot but she does not describe apneic periods. He has very mild GERD. He has never had a stress test, pulmonary function tests etc.\par December 9, 2014. Patient return for a stress echocardiogram. Exercised for 8 minutes. The target heart rate of 150 for no chest pain or significant shortness of breath. No ST changes. Echocardiogram totally normal with no evidence of ischemia.\par July 21, 2017. First visit in almost 3 years. Patient has been doing well in the interim continuing to have the same symptom of occasional shortness of breath when he is walking, stops and it goes away, and then he can continue. No exertional chest pain. No change in intensity or frequency compared with 3 years ago, when he had a normal stress echo. He was scheduled for routine preop for cataract surgery and was found to be in atrial fibrillation yesterday. No symptoms of palpitations or any change in aerobic capacity. Rate seems to be controlled without medication. He is here now for evaluation of this new problem.\par August 18, 2017. Patient returns for followup along with stress test and echocardiogram. His echocardiogram is unremarkable except for mild MR. Normal LV and RV function. His plain stress test had no ischemia, but very frequent APCs and short bursts of 4-6 beat atrial tachycardia in recovery, but no atrial fibrillation. The patient Is back in sinus rhythm. His atrial fibrillation is mostly from conduction disease with maybe some component from his MR given that he has normal LV function and no ischemia. We have multiple choices. Given his underlying conduction disease he may have more problems with sotalol then with flecainide, so we will try flecainide at only 50 mg q.12 h. Long discussion about the pros and cons of eventually coming off anticoagulation. He will followup in one month and we will reevaluate.\par September 26, 2017. Patient returns now on flecainide 50 mg q.12 h. Is in sinus bradycardia at 56 with an otherwise normal ECG. He feels fine, with no complaints.\par December 1, 2017. Patient is here in followup. He feels wonderful without complaints. Is in sinus rhythm at 63, with a normal QRS, and QT, and no side effects. He will be seeing Dr. Smalls that next week for a checkup and a flu shot\par June 1, 2018. Patient returns in followup. Remains in sinus bradycardia at 52. Tolerating the flecainide. Tolerating the Xarelto with no complaints.\par December 13, 2018. Patient returns in followup. He remains in sinus bradycardia at 56 with an otherwise normal. EKG. Will be retiring soon and has a trip planned to [place] in February. No symptoms. No change in medication. No complaints.\par April 25, 2019. Patient here in followup. Remains in sinus rhythm at 57 with acceptable QRS and QT.\par Had a great trip to [place]. Is totally relaxed and feels wonderful. No interval medical issues only getting nervous about the cost of Xarelto.\par August 28, 2019.  Patient returns for stress echocardiogram.  This time able to do a full 9 minutes without any symptoms.  No APCs or VPCs until recovery when there were occasional APCs.  No atrial fibrillation or atrial tachycardia.  No evidence of ischemia either by echo or by ECG.  Very mild AS and MR with normal LV and RV function.

## 2019-08-28 NOTE — PHYSICAL EXAM
[Normal Appearance] : normal appearance [General Appearance - Well Developed] : well developed [Well Groomed] : well groomed [General Appearance - Well Nourished] : well nourished [No Deformities] : no deformities [General Appearance - In No Acute Distress] : no acute distress [Normal Conjunctiva] : the conjunctiva exhibited no abnormalities [Eyelids - No Xanthelasma] : the eyelids demonstrated no xanthelasmas [No Oral Pallor] : no oral pallor [No Oral Cyanosis] : no oral cyanosis [Normal Oral Mucosa] : normal oral mucosa [Normal Jugular Venous A Waves Present] : normal jugular venous A waves present [Normal Jugular Venous V Waves Present] : normal jugular venous V waves present [No Jugular Venous Lehman A Waves] : no jugular venous lehman A waves [Respiration, Rhythm And Depth] : normal respiratory rhythm and effort [Exaggerated Use Of Accessory Muscles For Inspiration] : no accessory muscle use [Heart Rate And Rhythm] : heart rate and rhythm were normal [Auscultation Breath Sounds / Voice Sounds] : lungs were clear to auscultation bilaterally [Heart Sounds] : normal S1 and S2 [Murmurs] : no murmurs present [Abdomen Soft] : soft [Abdomen Tenderness] : non-tender [Abdomen Mass (___ Cm)] : no abdominal mass palpated [Abnormal Walk] : normal gait [Gait - Sufficient For Exercise Testing] : the gait was sufficient for exercise testing [Nail Clubbing] : no clubbing of the fingernails [Cyanosis, Localized] : no localized cyanosis [Petechial Hemorrhages (___cm)] : no petechial hemorrhages [Skin Color & Pigmentation] : normal skin color and pigmentation [] : no rash [No Venous Stasis] : no venous stasis [Skin Lesions] : no skin lesions [No Skin Ulcers] : no skin ulcer [Oriented To Time, Place, And Person] : oriented to person, place, and time [No Xanthoma] : no  xanthoma was observed [No Anxiety] : not feeling anxious [Affect] : the affect was normal [Mood] : the mood was normal [FreeTextEntry1] : No edema. Pulses 2+ bilaterally symmetrical including pedal

## 2019-10-13 ENCOUNTER — RX RENEWAL (OUTPATIENT)
Age: 69
End: 2019-10-13

## 2019-10-30 ENCOUNTER — APPOINTMENT (OUTPATIENT)
Dept: CARDIOLOGY | Facility: CLINIC | Age: 69
End: 2019-10-30
Payer: MEDICARE

## 2019-10-30 ENCOUNTER — NON-APPOINTMENT (OUTPATIENT)
Age: 69
End: 2019-10-30

## 2019-10-30 VITALS
OXYGEN SATURATION: 99 % | HEIGHT: 68 IN | BODY MASS INDEX: 27.58 KG/M2 | SYSTOLIC BLOOD PRESSURE: 121 MMHG | WEIGHT: 182 LBS | HEART RATE: 56 BPM | TEMPERATURE: 97.7 F | DIASTOLIC BLOOD PRESSURE: 73 MMHG

## 2019-10-30 DIAGNOSIS — R00.1 BRADYCARDIA, UNSPECIFIED: ICD-10-CM

## 2019-10-30 PROCEDURE — 36415 COLL VENOUS BLD VENIPUNCTURE: CPT

## 2019-10-30 PROCEDURE — 93000 ELECTROCARDIOGRAM COMPLETE: CPT

## 2019-10-30 PROCEDURE — 99214 OFFICE O/P EST MOD 30 MIN: CPT

## 2019-10-30 NOTE — PHYSICAL EXAM
[General Appearance - Well Developed] : well developed [Normal Appearance] : normal appearance [Well Groomed] : well groomed [General Appearance - Well Nourished] : well nourished [No Deformities] : no deformities [General Appearance - In No Acute Distress] : no acute distress [Normal Conjunctiva] : the conjunctiva exhibited no abnormalities [Eyelids - No Xanthelasma] : the eyelids demonstrated no xanthelasmas [Normal Oral Mucosa] : normal oral mucosa [No Oral Pallor] : no oral pallor [No Oral Cyanosis] : no oral cyanosis [Normal Jugular Venous A Waves Present] : normal jugular venous A waves present [Normal Jugular Venous V Waves Present] : normal jugular venous V waves present [No Jugular Venous Lehman A Waves] : no jugular venous lehman A waves [Respiration, Rhythm And Depth] : normal respiratory rhythm and effort [Exaggerated Use Of Accessory Muscles For Inspiration] : no accessory muscle use [Auscultation Breath Sounds / Voice Sounds] : lungs were clear to auscultation bilaterally [Heart Rate And Rhythm] : heart rate and rhythm were normal [Heart Sounds] : normal S1 and S2 [Murmurs] : no murmurs present [Abdomen Soft] : soft [Abdomen Tenderness] : non-tender [Abdomen Mass (___ Cm)] : no abdominal mass palpated [Abnormal Walk] : normal gait [Gait - Sufficient For Exercise Testing] : the gait was sufficient for exercise testing [Nail Clubbing] : no clubbing of the fingernails [Cyanosis, Localized] : no localized cyanosis [Petechial Hemorrhages (___cm)] : no petechial hemorrhages [FreeTextEntry1] : No edema. Pulses 2+ bilaterally symmetrical including pedal [Skin Color & Pigmentation] : normal skin color and pigmentation [] : no rash [No Venous Stasis] : no venous stasis [Skin Lesions] : no skin lesions [No Skin Ulcers] : no skin ulcer [No Xanthoma] : no  xanthoma was observed [Oriented To Time, Place, And Person] : oriented to person, place, and time [Affect] : the affect was normal [Mood] : the mood was normal [No Anxiety] : not feeling anxious

## 2019-10-30 NOTE — HISTORY OF PRESENT ILLNESS
[FreeTextEntry1] : November 20, 2014. Patient has been pretty healthy for most of his life. For the past year or so he notes that if he exerts himself he gets short of breath especially if strenuous. He feels like he has to stop but he does not have any chest pain. If he is walking flat he does fine but any incline or anything heavy like shoveling causes shortness of breath and he has to stop. His risk factor for coronary artery disease seems to be a family history with a father who had a stent and probably bypass surgery. His hemoglobin A1c recently was 5.8. His lipids a year ago had mildly elevated LDL and HDL in the 50s. No hypertension and no cigarette smoking. Past medical history is otherwise unremarkable. He has had no surgeries and his only hospitalization was in college for either hepatitis or mononucleosis. His wife mentions that he does snore a lot but she does not describe apneic periods. He has very mild GERD. He has never had a stress test, pulmonary function tests etc.\par December 9, 2014. Patient return for a stress echocardiogram. Exercised for 8 minutes. The target heart rate of 150 for no chest pain or significant shortness of breath. No ST changes. Echocardiogram totally normal with no evidence of ischemia.\par July 21, 2017. First visit in almost 3 years. Patient has been doing well in the interim continuing to have the same symptom of occasional shortness of breath when he is walking, stops and it goes away, and then he can continue. No exertional chest pain. No change in intensity or frequency compared with 3 years ago, when he had a normal stress echo. He was scheduled for routine preop for cataract surgery and was found to be in atrial fibrillation yesterday. No symptoms of palpitations or any change in aerobic capacity. Rate seems to be controlled without medication. He is here now for evaluation of this new problem.\par August 18, 2017. Patient returns for followup along with stress test and echocardiogram. His echocardiogram is unremarkable except for mild MR. Normal LV and RV function. His plain stress test had no ischemia, but very frequent APCs and short bursts of 4-6 beat atrial tachycardia in recovery, but no atrial fibrillation. The patient Is back in sinus rhythm. His atrial fibrillation is mostly from conduction disease with maybe some component from his MR given that he has normal LV function and no ischemia. We have multiple choices. Given his underlying conduction disease he may have more problems with sotalol then with flecainide, so we will try flecainide at only 50 mg q.12 h. Long discussion about the pros and cons of eventually coming off anticoagulation. He will followup in one month and we will reevaluate.\par September 26, 2017. Patient returns now on flecainide 50 mg q.12 h. Is in sinus bradycardia at 56 with an otherwise normal ECG. He feels fine, with no complaints.\par December 1, 2017. Patient is here in followup. He feels wonderful without complaints. Is in sinus rhythm at 63, with a normal QRS, and QT, and no side effects. He will be seeing Dr. Smalls that next week for a checkup and a flu shot\par June 1, 2018. Patient returns in followup. Remains in sinus bradycardia at 52. Tolerating the flecainide. Tolerating the Xarelto with no complaints.\par December 13, 2018. Patient returns in followup. He remains in sinus bradycardia at 56 with an otherwise normal. EKG. Will be retiring soon and has a trip planned to [place] in February. No symptoms. No change in medication. No complaints.\par April 25, 2019. Patient here in followup. Remains in sinus rhythm at 57 with acceptable QRS and QT.\par Had a great trip to [place]. Is totally relaxed and feels wonderful. No interval medical issues only getting nervous about the cost of Xarelto.\par August 28, 2019.  Patient returns for stress echocardiogram.  This time able to do a full 9 minutes without any symptoms.  No APCs or VPCs until recovery when there were occasional APCs.  No atrial fibrillation or atrial tachycardia.  No evidence of ischemia either by echo or by ECG.  Very mild AS and MR with normal LV and RV function.\par October 30, 2019.  Patient here in follow-up.  No arrhythmias, no symptoms, no medical issues, no change in medication.  EKG remains with sinus bradycardia at 56 with normal QRS and QT.  Patient planning on going to [place] in February.

## 2019-10-30 NOTE — REVIEW OF SYSTEMS
[Recent Weight Gain (___ Lbs)] : no recent weight gain [Feeling Fatigued] : not feeling fatigued [Recent Weight Loss (___ Lbs)] : recent [unfilled] ~Ulb weight loss [see HPI] : see HPI [Shortness Of Breath] : no shortness of breath [Dyspnea on exertion] : not dyspnea during exertion [Chest  Pressure] : no chest pressure [Chest Pain] : no chest pain [Lower Ext Edema] : no extremity edema [Leg Claudication] : no intermittent leg claudication [Palpitations] : no palpitations [Cough] : no cough [Wheezing] : no wheezing [Heartburn] : no heartburn [Change in Appetite] : no change in appetite [Change In The Stool] : no change in stool [Dizziness] : no dizziness [Anxiety] : no anxiety [Under Stress] : not under stress [Negative] : Heme/Lymph

## 2019-10-31 LAB
ALBUMIN SERPL ELPH-MCNC: 4.5 G/DL
ALP BLD-CCNC: 67 U/L
ALT SERPL-CCNC: 16 U/L
ANION GAP SERPL CALC-SCNC: 14 MMOL/L
AST SERPL-CCNC: 14 U/L
BASOPHILS # BLD AUTO: 0.04 K/UL
BASOPHILS NFR BLD AUTO: 0.7 %
BILIRUB SERPL-MCNC: 0.3 MG/DL
BUN SERPL-MCNC: 17 MG/DL
CALCIUM SERPL-MCNC: 9.5 MG/DL
CHLORIDE SERPL-SCNC: 100 MMOL/L
CHOLEST SERPL-MCNC: 179 MG/DL
CHOLEST/HDLC SERPL: 2.9 RATIO
CO2 SERPL-SCNC: 25 MMOL/L
CREAT SERPL-MCNC: 0.92 MG/DL
EOSINOPHIL # BLD AUTO: 0.19 K/UL
EOSINOPHIL NFR BLD AUTO: 3.1 %
ESTIMATED AVERAGE GLUCOSE: 108 MG/DL
GLUCOSE SERPL-MCNC: 86 MG/DL
HBA1C MFR BLD HPLC: 5.4 %
HCT VFR BLD CALC: 42.7 %
HDLC SERPL-MCNC: 61 MG/DL
HGB BLD-MCNC: 13.5 G/DL
IMM GRANULOCYTES NFR BLD AUTO: 0.3 %
LDLC SERPL CALC-MCNC: 106 MG/DL
LYMPHOCYTES # BLD AUTO: 1.88 K/UL
LYMPHOCYTES NFR BLD AUTO: 30.9 %
MAN DIFF?: NORMAL
MCHC RBC-ENTMCNC: 30.2 PG
MCHC RBC-ENTMCNC: 31.6 GM/DL
MCV RBC AUTO: 95.5 FL
MONOCYTES # BLD AUTO: 0.48 K/UL
MONOCYTES NFR BLD AUTO: 7.9 %
NEUTROPHILS # BLD AUTO: 3.48 K/UL
NEUTROPHILS NFR BLD AUTO: 57.1 %
NT-PROBNP SERPL-MCNC: 25 PG/ML
PLATELET # BLD AUTO: 224 K/UL
POTASSIUM SERPL-SCNC: 4.7 MMOL/L
PROT SERPL-MCNC: 7 G/DL
RBC # BLD: 4.47 M/UL
RBC # FLD: 13.2 %
SODIUM SERPL-SCNC: 139 MMOL/L
TRIGL SERPL-MCNC: 59 MG/DL
TSH SERPL-ACNC: 2.1 UIU/ML
WBC # FLD AUTO: 6.09 K/UL

## 2020-01-03 ENCOUNTER — RX RENEWAL (OUTPATIENT)
Age: 70
End: 2020-01-03

## 2020-01-23 ENCOUNTER — APPOINTMENT (OUTPATIENT)
Dept: INTERNAL MEDICINE | Facility: CLINIC | Age: 70
End: 2020-01-23

## 2020-01-28 ENCOUNTER — APPOINTMENT (OUTPATIENT)
Dept: INTERNAL MEDICINE | Facility: CLINIC | Age: 70
End: 2020-01-28
Payer: MEDICARE

## 2020-01-28 VITALS
WEIGHT: 187 LBS | SYSTOLIC BLOOD PRESSURE: 130 MMHG | OXYGEN SATURATION: 99 % | DIASTOLIC BLOOD PRESSURE: 70 MMHG | TEMPERATURE: 98 F | HEIGHT: 68.5 IN | HEART RATE: 67 BPM | BODY MASS INDEX: 28.02 KG/M2

## 2020-01-28 PROCEDURE — G0444 DEPRESSION SCREEN ANNUAL: CPT

## 2020-01-28 PROCEDURE — G0439: CPT

## 2020-01-28 PROCEDURE — 36415 COLL VENOUS BLD VENIPUNCTURE: CPT

## 2020-01-28 RX ORDER — SILDENAFIL 100 MG/1
100 TABLET, FILM COATED ORAL
Qty: 10 | Refills: 2 | Status: DISCONTINUED | COMMUNITY
Start: 2018-07-16 | End: 2020-01-28

## 2020-01-28 NOTE — PHYSICAL EXAM
[No Acute Distress] : no acute distress [Well Nourished] : well nourished [Well Developed] : well developed [Well-Appearing] : well-appearing [Normal Voice/Communication] : normal voice/communication [Normal Sclera/Conjunctiva] : normal sclera/conjunctiva [PERRL] : pupils equal round and reactive to light [Normal Outer Ear/Nose] : the outer ears and nose were normal in appearance [Normal Oropharynx] : the oropharynx was normal [Normal TMs] : both tympanic membranes were normal [No JVD] : no jugular venous distention [Supple] : supple [No Lymphadenopathy] : no lymphadenopathy [Thyroid Normal, No Nodules] : the thyroid was normal and there were no nodules present [No Respiratory Distress] : no respiratory distress  [Clear to Auscultation] : lungs were clear to auscultation bilaterally [No Accessory Muscle Use] : no accessory muscle use [Normal Rate] : normal rate  [Normal S1, S2] : normal S1 and S2 [Regular Rhythm] : with a regular rhythm [No Murmur] : no murmur heard [No Carotid Bruits] : no carotid bruits [No Abdominal Bruit] : a ~M bruit was not heard ~T in the abdomen [No Varicosities] : no varicosities [Pedal Pulses Present] : the pedal pulses are present [No Extremity Clubbing/Cyanosis] : no extremity clubbing/cyanosis [No Edema] : there was no peripheral edema [Soft] : abdomen soft [Non Tender] : non-tender [Non-distended] : non-distended [No Masses] : no abdominal mass palpated [Normal Bowel Sounds] : normal bowel sounds [No HSM] : no HSM [Normal Sphincter Tone] : normal sphincter tone [No Mass] : no mass [Prostate Tenderness] : the prostate was not tender [No Prostate Nodules] : no prostate nodules [Normal Supraclavicular Nodes] : no supraclavicular lymphadenopathy [Normal Posterior Cervical Nodes] : no posterior cervical lymphadenopathy [Normal Anterior Cervical Nodes] : no anterior cervical lymphadenopathy [No Joint Swelling] : no joint swelling [No Spinal Tenderness] : no spinal tenderness [Grossly Normal Strength/Tone] : grossly normal strength/tone [No Rash] : no rash [Normal Gait] : normal gait [Coordination Grossly Intact] : coordination grossly intact [No Focal Deficits] : no focal deficits [Memory Grossly Normal] : memory grossly normal [Speech Grossly Normal] : speech grossly normal [Normal Affect] : the affect was normal [Alert and Oriented x3] : oriented to person, place, and time [Normal Mood] : the mood was normal [Normal Insight/Judgement] : insight and judgment were intact [FreeTextEntry1] : prostate enlarged

## 2020-01-28 NOTE — HISTORY OF PRESENT ILLNESS
[de-identified] : 69 y/o man presents for annual physical exam. he feels well overall currently w no new concerns. remains active. \par \par afib rate/rhythm controlled on flecainide, following w Dr Henderson, continues on Xarelto for anticoagulation without problems , no new events, echo imaging and stress testing w no evidence of ischemia \par chronic BPH symptoms on terazosin for years, mildly bothersome symptoms but he still declines to proceed w further tx \par \par last colonoscopy possibly 2017 w Dr Dutta, pt recalls normal results

## 2020-01-28 NOTE — HEALTH RISK ASSESSMENT
[No] : In the past 12 months have you used drugs other than those required for medical reasons? No [No falls in past year] : Patient reported no falls in the past year [None] : None [Retired] : retired [] :  [Fully functional (bathing, dressing, toileting, transferring, walking, feeding)] : Fully functional (bathing, dressing, toileting, transferring, walking, feeding) [Fully functional (using the telephone, shopping, preparing meals, housekeeping, doing laundry, using] : Fully functional and needs no help or supervision to perform IADLs (using the telephone, shopping, preparing meals, housekeeping, doing laundry, using transportation, managing medications and managing finances) [] : No

## 2020-01-28 NOTE — ASSESSMENT
[FreeTextEntry1] : discussed w pt \par \par cont current rx\par \par check routine labs as below\par reviewed most recent labs fom Dr Henderson from 10/19 , no concerns \par \par he declines to try new rx for BPH symptoms or see urology, cont to monitor symptoms of BPH , monitor PSA \par \par reviewed vaccines, UTD except  Shingrix, discussed, he prefers to have it done at the pharmacy\par \par he will check w Dr Dutta as to colonoscopy recommendations, he declines for now \par \par f/u w Dr Henderson as scheduled \par \par RTO yearly for routine exam or earlier prn if any new concerns

## 2020-02-20 LAB
ALBUMIN SERPL ELPH-MCNC: 4.6 G/DL
ALP BLD-CCNC: 71 U/L
ALT SERPL-CCNC: 17 U/L
ANION GAP SERPL CALC-SCNC: 14 MMOL/L
APPEARANCE: CLEAR
AST SERPL-CCNC: 16 U/L
BASOPHILS # BLD AUTO: 0.03 K/UL
BASOPHILS NFR BLD AUTO: 0.5 %
BILIRUB SERPL-MCNC: 0.4 MG/DL
BILIRUBIN URINE: NEGATIVE
BLOOD URINE: NEGATIVE
BUN SERPL-MCNC: 16 MG/DL
CALCIUM SERPL-MCNC: 9.7 MG/DL
CHLORIDE SERPL-SCNC: 100 MMOL/L
CHOLEST SERPL-MCNC: 190 MG/DL
CHOLEST/HDLC SERPL: 3 RATIO
CO2 SERPL-SCNC: 25 MMOL/L
COLOR: YELLOW
CREAT SERPL-MCNC: 0.94 MG/DL
EOSINOPHIL # BLD AUTO: 0.08 K/UL
EOSINOPHIL NFR BLD AUTO: 1.2 %
ESTIMATED AVERAGE GLUCOSE: 111 MG/DL
GLUCOSE QUALITATIVE U: NEGATIVE
GLUCOSE SERPL-MCNC: 112 MG/DL
HBA1C MFR BLD HPLC: 5.5 %
HCT VFR BLD CALC: 42.3 %
HDLC SERPL-MCNC: 64 MG/DL
HGB BLD-MCNC: 13.5 G/DL
IMM GRANULOCYTES NFR BLD AUTO: 0.3 %
KETONES URINE: NEGATIVE
LDLC SERPL CALC-MCNC: 110 MG/DL
LEUKOCYTE ESTERASE URINE: NEGATIVE
LYMPHOCYTES # BLD AUTO: 1.75 K/UL
LYMPHOCYTES NFR BLD AUTO: 27.1 %
MAN DIFF?: NORMAL
MCHC RBC-ENTMCNC: 30.1 PG
MCHC RBC-ENTMCNC: 31.9 GM/DL
MCV RBC AUTO: 94.2 FL
MONOCYTES # BLD AUTO: 0.43 K/UL
MONOCYTES NFR BLD AUTO: 6.7 %
NEUTROPHILS # BLD AUTO: 4.14 K/UL
NEUTROPHILS NFR BLD AUTO: 64.2 %
NITRITE URINE: NEGATIVE
PH URINE: 6.5
PLATELET # BLD AUTO: 241 K/UL
POTASSIUM SERPL-SCNC: 5 MMOL/L
PROT SERPL-MCNC: 7.3 G/DL
PROTEIN URINE: NORMAL
PSA SERPL-MCNC: 3.13 NG/ML
RBC # BLD: 4.49 M/UL
RBC # FLD: 13.2 %
SODIUM SERPL-SCNC: 139 MMOL/L
SPECIFIC GRAVITY URINE: 1.02
T4 FREE SERPL-MCNC: 1.4 NG/DL
TRIGL SERPL-MCNC: 82 MG/DL
TSH SERPL-ACNC: 2.05 UIU/ML
UROBILINOGEN URINE: NORMAL
WBC # FLD AUTO: 6.45 K/UL

## 2020-03-04 ENCOUNTER — NON-APPOINTMENT (OUTPATIENT)
Age: 70
End: 2020-03-04

## 2020-03-04 ENCOUNTER — APPOINTMENT (OUTPATIENT)
Dept: CARDIOLOGY | Facility: CLINIC | Age: 70
End: 2020-03-04
Payer: MEDICARE

## 2020-03-04 VITALS
WEIGHT: 190 LBS | SYSTOLIC BLOOD PRESSURE: 138 MMHG | DIASTOLIC BLOOD PRESSURE: 74 MMHG | HEIGHT: 65.6 IN | HEART RATE: 69 BPM | OXYGEN SATURATION: 98 % | BODY MASS INDEX: 30.9 KG/M2

## 2020-03-04 PROCEDURE — 93000 ELECTROCARDIOGRAM COMPLETE: CPT

## 2020-03-04 PROCEDURE — 99214 OFFICE O/P EST MOD 30 MIN: CPT

## 2020-03-04 NOTE — HISTORY OF PRESENT ILLNESS
[FreeTextEntry1] : November 20, 2014. Patient has been pretty healthy for most of his life. For the past year or so he notes that if he exerts himself he gets short of breath especially if strenuous. He feels like he has to stop but he does not have any chest pain. If he is walking flat he does fine but any incline or anything heavy like shoveling causes shortness of breath and he has to stop. His risk factor for coronary artery disease seems to be a family history with a father who had a stent and probably bypass surgery. His hemoglobin A1c recently was 5.8. His lipids a year ago had mildly elevated LDL and HDL in the 50s. No hypertension and no cigarette smoking. Past medical history is otherwise unremarkable. He has had no surgeries and his only hospitalization was in college for either hepatitis or mononucleosis. His wife mentions that he does snore a lot but she does not describe apneic periods. He has very mild GERD. He has never had a stress test, pulmonary function tests etc.\par December 9, 2014. Patient return for a stress echocardiogram. Exercised for 8 minutes. The target heart rate of 150 for no chest pain or significant shortness of breath. No ST changes. Echocardiogram totally normal with no evidence of ischemia.\par July 21, 2017. First visit in almost 3 years. Patient has been doing well in the interim continuing to have the same symptom of occasional shortness of breath when he is walking, stops and it goes away, and then he can continue. No exertional chest pain. No change in intensity or frequency compared with 3 years ago, when he had a normal stress echo. He was scheduled for routine preop for cataract surgery and was found to be in atrial fibrillation yesterday. No symptoms of palpitations or any change in aerobic capacity. Rate seems to be controlled without medication. He is here now for evaluation of this new problem.\par August 18, 2017. Patient returns for followup along with stress test and echocardiogram. His echocardiogram is unremarkable except for mild MR. Normal LV and RV function. His plain stress test had no ischemia, but very frequent APCs and short bursts of 4-6 beat atrial tachycardia in recovery, but no atrial fibrillation. The patient Is back in sinus rhythm. His atrial fibrillation is mostly from conduction disease with maybe some component from his MR given that he has normal LV function and no ischemia. We have multiple choices. Given his underlying conduction disease he may have more problems with sotalol then with flecainide, so we will try flecainide at only 50 mg q.12 h. Long discussion about the pros and cons of eventually coming off anticoagulation. He will followup in one month and we will reevaluate.\par September 26, 2017. Patient returns now on flecainide 50 mg q.12 h. Is in sinus bradycardia at 56 with an otherwise normal ECG. He feels fine, with no complaints.\par December 1, 2017. Patient is here in followup. He feels wonderful without complaints. Is in sinus rhythm at 63, with a normal QRS, and QT, and no side effects. He will be seeing Dr. Smalls that next week for a checkup and a flu shot\par June 1, 2018. Patient returns in followup. Remains in sinus bradycardia at 52. Tolerating the flecainide. Tolerating the Xarelto with no complaints.\par December 13, 2018. Patient returns in followup. He remains in sinus bradycardia at 56 with an otherwise normal. EKG. Will be retiring soon and has a trip planned to [place] in February. No symptoms. No change in medication. No complaints.\par April 25, 2019. Patient here in followup. Remains in sinus rhythm at 57 with acceptable QRS and QT.\par Had a great trip to [place]. Is totally relaxed and feels wonderful. No interval medical issues only getting nervous about the cost of Xarelto.\par August 28, 2019.  Patient returns for stress echocardiogram.  This time able to do a full 9 minutes without any symptoms.  No APCs or VPCs until recovery when there were occasional APCs.  No atrial fibrillation or atrial tachycardia.  No evidence of ischemia either by echo or by ECG.  Very mild AS and MR with normal LV and RV function.\par October 30, 2019.  Patient here in follow-up.  No arrhythmias, no symptoms, no medical issues, no change in medication.  EKG remains with sinus bradycardia at 56 with normal QRS and QT.  Patient planning on going to [place] in February.\par March 4, 2020.  Patient here in follow-up.  Put on a little weight while in [place].  No palpitations no symptoms.  Remains in sinus rhythm with a normal EKG.  Labs with Dr. Kidd included cholesterol 190, triglycerides 82, HDL 64, .  Hemoglobin A1c 5.5.

## 2020-03-04 NOTE — PHYSICAL EXAM
[General Appearance - Well Developed] : well developed [Normal Appearance] : normal appearance [Well Groomed] : well groomed [General Appearance - Well Nourished] : well nourished [No Deformities] : no deformities [General Appearance - In No Acute Distress] : no acute distress [Normal Conjunctiva] : the conjunctiva exhibited no abnormalities [Eyelids - No Xanthelasma] : the eyelids demonstrated no xanthelasmas [Normal Oral Mucosa] : normal oral mucosa [No Oral Pallor] : no oral pallor [No Oral Cyanosis] : no oral cyanosis [Normal Jugular Venous A Waves Present] : normal jugular venous A waves present [Normal Jugular Venous V Waves Present] : normal jugular venous V waves present [No Jugular Venous Lehman A Waves] : no jugular venous lehman A waves [Respiration, Rhythm And Depth] : normal respiratory rhythm and effort [Exaggerated Use Of Accessory Muscles For Inspiration] : no accessory muscle use [Auscultation Breath Sounds / Voice Sounds] : lungs were clear to auscultation bilaterally [Heart Sounds] : normal S1 and S2 [Heart Rate And Rhythm] : heart rate and rhythm were normal [Murmurs] : no murmurs present [Abdomen Soft] : soft [Abdomen Tenderness] : non-tender [Abdomen Mass (___ Cm)] : no abdominal mass palpated [Abnormal Walk] : normal gait [Gait - Sufficient For Exercise Testing] : the gait was sufficient for exercise testing [Nail Clubbing] : no clubbing of the fingernails [Cyanosis, Localized] : no localized cyanosis [Petechial Hemorrhages (___cm)] : no petechial hemorrhages [Skin Color & Pigmentation] : normal skin color and pigmentation [] : no rash [No Venous Stasis] : no venous stasis [Skin Lesions] : no skin lesions [No Skin Ulcers] : no skin ulcer [No Xanthoma] : no  xanthoma was observed [Oriented To Time, Place, And Person] : oriented to person, place, and time [Affect] : the affect was normal [Mood] : the mood was normal [No Anxiety] : not feeling anxious [FreeTextEntry1] : No edema. Pulses 2+ bilaterally symmetrical including pedal

## 2020-06-30 ENCOUNTER — RX RENEWAL (OUTPATIENT)
Age: 70
End: 2020-06-30

## 2020-07-03 ENCOUNTER — RX RENEWAL (OUTPATIENT)
Age: 70
End: 2020-07-03

## 2020-08-11 ENCOUNTER — NON-APPOINTMENT (OUTPATIENT)
Age: 70
End: 2020-08-11

## 2020-08-11 ENCOUNTER — APPOINTMENT (OUTPATIENT)
Dept: CARDIOLOGY | Facility: CLINIC | Age: 70
End: 2020-08-11
Payer: MEDICARE

## 2020-08-11 VITALS
OXYGEN SATURATION: 98 % | WEIGHT: 183 LBS | HEART RATE: 64 BPM | DIASTOLIC BLOOD PRESSURE: 69 MMHG | SYSTOLIC BLOOD PRESSURE: 140 MMHG | TEMPERATURE: 98 F | BODY MASS INDEX: 29.9 KG/M2

## 2020-08-11 PROCEDURE — 99214 OFFICE O/P EST MOD 30 MIN: CPT

## 2020-08-11 PROCEDURE — 36415 COLL VENOUS BLD VENIPUNCTURE: CPT

## 2020-08-11 PROCEDURE — 93000 ELECTROCARDIOGRAM COMPLETE: CPT

## 2020-08-11 NOTE — HISTORY OF PRESENT ILLNESS
[FreeTextEntry1] : November 20, 2014. Patient has been pretty healthy for most of his life. For the past year or so he notes that if he exerts himself he gets short of breath especially if strenuous. He feels like he has to stop but he does not have any chest pain. If he is walking flat he does fine but any incline or anything heavy like shoveling causes shortness of breath and he has to stop. His risk factor for coronary artery disease seems to be a family history with a father who had a stent and probably bypass surgery. His hemoglobin A1c recently was 5.8. His lipids a year ago had mildly elevated LDL and HDL in the 50s. No hypertension and no cigarette smoking. Past medical history is otherwise unremarkable. He has had no surgeries and his only hospitalization was in college for either hepatitis or mononucleosis. His wife mentions that he does snore a lot but she does not describe apneic periods. He has very mild GERD. He has never had a stress test, pulmonary function tests etc.\par December 9, 2014. Patient return for a stress echocardiogram. Exercised for 8 minutes. The target heart rate of 150 for no chest pain or significant shortness of breath. No ST changes. Echocardiogram totally normal with no evidence of ischemia.\par July 21, 2017. First visit in almost 3 years. Patient has been doing well in the interim continuing to have the same symptom of occasional shortness of breath when he is walking, stops and it goes away, and then he can continue. No exertional chest pain. No change in intensity or frequency compared with 3 years ago, when he had a normal stress echo. He was scheduled for routine preop for cataract surgery and was found to be in atrial fibrillation yesterday. No symptoms of palpitations or any change in aerobic capacity. Rate seems to be controlled without medication. He is here now for evaluation of this new problem.\par August 18, 2017. Patient returns for followup along with stress test and echocardiogram. His echocardiogram is unremarkable except for mild MR. Normal LV and RV function. His plain stress test had no ischemia, but very frequent APCs and short bursts of 4-6 beat atrial tachycardia in recovery, but no atrial fibrillation. The patient Is back in sinus rhythm. His atrial fibrillation is mostly from conduction disease with maybe some component from his MR given that he has normal LV function and no ischemia. We have multiple choices. Given his underlying conduction disease he may have more problems with sotalol then with flecainide, so we will try flecainide at only 50 mg q.12 h. Long discussion about the pros and cons of eventually coming off anticoagulation. He will followup in one month and we will reevaluate.\par September 26, 2017. Patient returns now on flecainide 50 mg q.12 h. Is in sinus bradycardia at 56 with an otherwise normal ECG. He feels fine, with no complaints.\par December 1, 2017. Patient is here in followup. He feels wonderful without complaints. Is in sinus rhythm at 63, with a normal QRS, and QT, and no side effects. He will be seeing Dr. Smalls that next week for a checkup and a flu shot\par June 1, 2018. Patient returns in followup. Remains in sinus bradycardia at 52. Tolerating the flecainide. Tolerating the Xarelto with no complaints.\par December 13, 2018. Patient returns in followup. He remains in sinus bradycardia at 56 with an otherwise normal. EKG. Will be retiring soon and has a trip planned to [place] in February. No symptoms. No change in medication. No complaints.\par April 25, 2019. Patient here in followup. Remains in sinus rhythm at 57 with acceptable QRS and QT.\par Had a great trip to [place]. Is totally relaxed and feels wonderful. No interval medical issues only getting nervous about the cost of Xarelto.\par August 28, 2019.  Patient returns for stress echocardiogram.  This time able to do a full 9 minutes without any symptoms.  No APCs or VPCs until recovery when there were occasional APCs.  No atrial fibrillation or atrial tachycardia.  No evidence of ischemia either by echo or by ECG.  Very mild AS and MR with normal LV and RV function.\par October 30, 2019.  Patient here in follow-up.  No arrhythmias, no symptoms, no medical issues, no change in medication.  EKG remains with sinus bradycardia at 56 with normal QRS and QT.  Patient planning on going to [place] in February.\par March 4, 2020.  Patient here in follow-up.  Put on a little weight while in [place].  No palpitations no symptoms.  Remains in sinus rhythm with a normal EKG.  Labs with Dr. Kidd included cholesterol 190, triglycerides 82, HDL 64, .  Hemoglobin A1c 5.5.\par August 11, 2020.  Patient here in follow-up.  No complaints.  Remains in sinus rhythm at 56 with normal QRS and QT.

## 2020-08-11 NOTE — PHYSICAL EXAM
[General Appearance - Well Developed] : well developed [Well Groomed] : well groomed [Normal Appearance] : normal appearance [No Deformities] : no deformities [General Appearance - Well Nourished] : well nourished [General Appearance - In No Acute Distress] : no acute distress [Eyelids - No Xanthelasma] : the eyelids demonstrated no xanthelasmas [Normal Conjunctiva] : the conjunctiva exhibited no abnormalities [Normal Oral Mucosa] : normal oral mucosa [No Oral Pallor] : no oral pallor [No Oral Cyanosis] : no oral cyanosis [Normal Jugular Venous V Waves Present] : normal jugular venous V waves present [Normal Jugular Venous A Waves Present] : normal jugular venous A waves present [No Jugular Venous Lehman A Waves] : no jugular venous lehman A waves [Auscultation Breath Sounds / Voice Sounds] : lungs were clear to auscultation bilaterally [Exaggerated Use Of Accessory Muscles For Inspiration] : no accessory muscle use [Respiration, Rhythm And Depth] : normal respiratory rhythm and effort [Murmurs] : no murmurs present [Heart Sounds] : normal S1 and S2 [Heart Rate And Rhythm] : heart rate and rhythm were normal [Abdomen Soft] : soft [Abdomen Tenderness] : non-tender [Abdomen Mass (___ Cm)] : no abdominal mass palpated [Abnormal Walk] : normal gait [Gait - Sufficient For Exercise Testing] : the gait was sufficient for exercise testing [Nail Clubbing] : no clubbing of the fingernails [Cyanosis, Localized] : no localized cyanosis [Petechial Hemorrhages (___cm)] : no petechial hemorrhages [FreeTextEntry1] : No edema. Pulses 2+ bilaterally symmetrical including pedal [] : no rash [Skin Color & Pigmentation] : normal skin color and pigmentation [Skin Lesions] : no skin lesions [No Venous Stasis] : no venous stasis [No Skin Ulcers] : no skin ulcer [Oriented To Time, Place, And Person] : oriented to person, place, and time [No Xanthoma] : no  xanthoma was observed [Affect] : the affect was normal [No Anxiety] : not feeling anxious [Mood] : the mood was normal

## 2020-08-11 NOTE — REVIEW OF SYSTEMS
[Recent Weight Gain (___ Lbs)] : no recent weight gain [Feeling Fatigued] : not feeling fatigued [Recent Weight Loss (___ Lbs)] : recent [unfilled] ~Ulb weight loss [see HPI] : see HPI [Shortness Of Breath] : no shortness of breath [Chest  Pressure] : no chest pressure [Dyspnea on exertion] : not dyspnea during exertion [Chest Pain] : no chest pain [Lower Ext Edema] : no extremity edema [Leg Claudication] : no intermittent leg claudication [Cough] : no cough [Palpitations] : no palpitations [Wheezing] : no wheezing [Heartburn] : no heartburn [Change in Appetite] : no change in appetite [Change In The Stool] : no change in stool [Anxiety] : no anxiety [Dizziness] : no dizziness [Under Stress] : not under stress [Negative] : Heme/Lymph

## 2020-08-11 NOTE — CARDIOLOGY SUMMARY
[No Ischemia] : no Ischemia [___] : [unfilled] [LVEF ___%] : LVEF [unfilled]% [None] : no mitral regurgitation [Normal] : normal LA size

## 2020-08-12 LAB
ALBUMIN SERPL ELPH-MCNC: 4.4 G/DL
ALP BLD-CCNC: 55 U/L
ALT SERPL-CCNC: 17 U/L
ANION GAP SERPL CALC-SCNC: 10 MMOL/L
AST SERPL-CCNC: 16 U/L
BASOPHILS # BLD AUTO: 0.04 K/UL
BASOPHILS NFR BLD AUTO: 0.6 %
BILIRUB SERPL-MCNC: 0.4 MG/DL
BUN SERPL-MCNC: 15 MG/DL
CALCIUM SERPL-MCNC: 9.2 MG/DL
CHLORIDE SERPL-SCNC: 102 MMOL/L
CHOLEST SERPL-MCNC: 185 MG/DL
CHOLEST/HDLC SERPL: 3.3 RATIO
CO2 SERPL-SCNC: 26 MMOL/L
CREAT SERPL-MCNC: 1.05 MG/DL
EOSINOPHIL # BLD AUTO: 0.12 K/UL
EOSINOPHIL NFR BLD AUTO: 1.8 %
ESTIMATED AVERAGE GLUCOSE: 108 MG/DL
GLUCOSE SERPL-MCNC: 103 MG/DL
HBA1C MFR BLD HPLC: 5.4 %
HCT VFR BLD CALC: 40.4 %
HDLC SERPL-MCNC: 57 MG/DL
HGB BLD-MCNC: 13.1 G/DL
IMM GRANULOCYTES NFR BLD AUTO: 0.2 %
LDLC SERPL CALC-MCNC: 110 MG/DL
LYMPHOCYTES # BLD AUTO: 1.84 K/UL
LYMPHOCYTES NFR BLD AUTO: 27.7 %
MAN DIFF?: NORMAL
MCHC RBC-ENTMCNC: 30.2 PG
MCHC RBC-ENTMCNC: 32.4 GM/DL
MCV RBC AUTO: 93.1 FL
MONOCYTES # BLD AUTO: 0.53 K/UL
MONOCYTES NFR BLD AUTO: 8 %
NEUTROPHILS # BLD AUTO: 4.11 K/UL
NEUTROPHILS NFR BLD AUTO: 61.7 %
NT-PROBNP SERPL-MCNC: 71 PG/ML
PLATELET # BLD AUTO: 211 K/UL
POTASSIUM SERPL-SCNC: 4.8 MMOL/L
PROT SERPL-MCNC: 7 G/DL
RBC # BLD: 4.34 M/UL
RBC # FLD: 13.2 %
SODIUM SERPL-SCNC: 139 MMOL/L
TRIGL SERPL-MCNC: 89 MG/DL
WBC # FLD AUTO: 6.65 K/UL

## 2020-09-02 ENCOUNTER — APPOINTMENT (OUTPATIENT)
Dept: CARDIOLOGY | Facility: CLINIC | Age: 70
End: 2020-09-02

## 2020-10-19 ENCOUNTER — RX RENEWAL (OUTPATIENT)
Age: 70
End: 2020-10-19

## 2021-01-29 ENCOUNTER — RX RENEWAL (OUTPATIENT)
Age: 71
End: 2021-01-29

## 2021-02-01 ENCOUNTER — APPOINTMENT (OUTPATIENT)
Dept: INTERNAL MEDICINE | Facility: CLINIC | Age: 71
End: 2021-02-01
Payer: MEDICARE

## 2021-02-17 ENCOUNTER — TRANSCRIPTION ENCOUNTER (OUTPATIENT)
Age: 71
End: 2021-02-17

## 2021-02-17 ENCOUNTER — APPOINTMENT (OUTPATIENT)
Dept: INTERNAL MEDICINE | Facility: CLINIC | Age: 71
End: 2021-02-17
Payer: MEDICARE

## 2021-02-17 VITALS
DIASTOLIC BLOOD PRESSURE: 80 MMHG | OXYGEN SATURATION: 97 % | WEIGHT: 181 LBS | HEIGHT: 68.5 IN | HEART RATE: 69 BPM | BODY MASS INDEX: 27.12 KG/M2 | SYSTOLIC BLOOD PRESSURE: 120 MMHG | TEMPERATURE: 97.7 F

## 2021-02-17 DIAGNOSIS — Z11.59 ENCOUNTER FOR SCREENING FOR OTHER VIRAL DISEASES: ICD-10-CM

## 2021-02-17 PROCEDURE — 99072 ADDL SUPL MATRL&STAF TM PHE: CPT

## 2021-02-17 PROCEDURE — G0444 DEPRESSION SCREEN ANNUAL: CPT

## 2021-02-17 PROCEDURE — 36415 COLL VENOUS BLD VENIPUNCTURE: CPT

## 2021-02-17 PROCEDURE — G0439: CPT

## 2021-02-17 NOTE — PHYSICAL EXAM
[No Acute Distress] : no acute distress [Well Nourished] : well nourished [Well Developed] : well developed [Well-Appearing] : well-appearing [Normal Voice/Communication] : normal voice/communication [Normal Sclera/Conjunctiva] : normal sclera/conjunctiva [PERRL] : pupils equal round and reactive to light [Normal Outer Ear/Nose] : the outer ears and nose were normal in appearance [Normal Oropharynx] : the oropharynx was normal [Normal TMs] : both tympanic membranes were normal [No JVD] : no jugular venous distention [Supple] : supple [No Lymphadenopathy] : no lymphadenopathy [Thyroid Normal, No Nodules] : the thyroid was normal and there were no nodules present [No Respiratory Distress] : no respiratory distress  [Clear to Auscultation] : lungs were clear to auscultation bilaterally [No Accessory Muscle Use] : no accessory muscle use [Normal Rate] : normal rate  [Regular Rhythm] : with a regular rhythm [Normal S1, S2] : normal S1 and S2 [No Murmur] : no murmur heard [No Carotid Bruits] : no carotid bruits [No Abdominal Bruit] : a ~M bruit was not heard ~T in the abdomen [No Varicosities] : no varicosities [Pedal Pulses Present] : the pedal pulses are present [No Edema] : there was no peripheral edema [No Extremity Clubbing/Cyanosis] : no extremity clubbing/cyanosis [Soft] : abdomen soft [Non Tender] : non-tender [Non-distended] : non-distended [No Masses] : no abdominal mass palpated [No HSM] : no HSM [Normal Bowel Sounds] : normal bowel sounds [Normal Sphincter Tone] : normal sphincter tone [No Mass] : no mass [Prostate Tenderness] : the prostate was not tender [No Prostate Nodules] : no prostate nodules [Normal Supraclavicular Nodes] : no supraclavicular lymphadenopathy [Normal Posterior Cervical Nodes] : no posterior cervical lymphadenopathy [Normal Anterior Cervical Nodes] : no anterior cervical lymphadenopathy [No Spinal Tenderness] : no spinal tenderness [No Joint Swelling] : no joint swelling [Grossly Normal Strength/Tone] : grossly normal strength/tone [No Rash] : no rash [Normal Gait] : normal gait [Coordination Grossly Intact] : coordination grossly intact [No Focal Deficits] : no focal deficits [Speech Grossly Normal] : speech grossly normal [Memory Grossly Normal] : memory grossly normal [Normal Affect] : the affect was normal [Alert and Oriented x3] : oriented to person, place, and time [Normal Mood] : the mood was normal [Normal Insight/Judgement] : insight and judgment were intact [FreeTextEntry1] : prostate enlarged

## 2021-02-17 NOTE — HISTORY OF PRESENT ILLNESS
[de-identified] : 72 y/o man presents for annual physical exam. he feels well overall currently w no new concerns. no illnesses during COVID19 pandemic. he remains fairly active. he has been somewhat more restrictive w his diet. noted some weight loss of about 10 lbs compared to last year at this time. no abd pain or change in stools. \par he is scheduled for the COVID19 vaccine shortly. \par \par afib rate/rhythm controlled on flecainide, following w Dr Henderson, continues on Xarelto for anticoagulation without problems , no new events, echo imaging and stress testing w no evidence of ischemia , recent f/u w Dr Henderson and testing reviewed \par chronic BPH symptoms on terazosin for years, symptoms stable \par \par he has occasional rectal irritation, possibly due to a small hemorrhoid \par \par last colonoscopy possibly 2017 w Dr Dutta, pt recalls normal results

## 2021-02-17 NOTE — ASSESSMENT
[FreeTextEntry1] : discussed w pt \par \par cont current rx\par \par check additional routine labs as below\par reviewed most recent labs fom Dr Henderson from 8/20 , and cardiovascular evaluation \par \par noted weight loss of about 10 lbs since last year, in setting of increased activity and some dietary restrictions that he has been careful with. advised him to monitor weight, if further unintentional weight loss is noted, will recommend repeat colonoscopy screening w GI \par \par  cont to monitor symptoms of BPH , monitor PSA \par \par reviewed vaccines,  Shingrix vaccine discussed, and would prefer him to wait few months after his upcoming COVID vaccine before having Shingrix vaccine \par \par he will check w Dr Dutta as to colonoscopy recommendations, consider Cologuard testing \par \par f/u w Dr Henderson as scheduled \par \par RTO yearly for routine exam or earlier prn if any new concerns

## 2021-02-17 NOTE — HEALTH RISK ASSESSMENT
[No] : In the past 12 months have you used drugs other than those required for medical reasons? No [None] : None [Retired] : retired [] :  [Fully functional (bathing, dressing, toileting, transferring, walking, feeding)] : Fully functional (bathing, dressing, toileting, transferring, walking, feeding) [Fully functional (using the telephone, shopping, preparing meals, housekeeping, doing laundry, using] : Fully functional and needs no help or supervision to perform IADLs (using the telephone, shopping, preparing meals, housekeeping, doing laundry, using transportation, managing medications and managing finances) [Patient reported colonoscopy was normal] : Patient reported colonoscopy was normal [] : No [ColonoscopyDate] : 12/17

## 2021-02-19 LAB
APPEARANCE: CLEAR
BILIRUBIN URINE: NEGATIVE
BLOOD URINE: NEGATIVE
COLOR: YELLOW
GLUCOSE QUALITATIVE U: NEGATIVE
KETONES URINE: NEGATIVE
LEUKOCYTE ESTERASE URINE: NEGATIVE
NITRITE URINE: NEGATIVE
PH URINE: 6.5
PROTEIN URINE: NORMAL
PSA SERPL-MCNC: 3.17 NG/ML
SARS-COV-2 IGG SERPL IA-ACNC: 0.06 INDEX
SARS-COV-2 IGG SERPL QL IA: NEGATIVE
SPECIFIC GRAVITY URINE: 1.02
T4 FREE SERPL-MCNC: 1.3 NG/DL
TSH SERPL-ACNC: 1.63 UIU/ML
UROBILINOGEN URINE: NORMAL

## 2021-05-04 ENCOUNTER — RX RENEWAL (OUTPATIENT)
Age: 71
End: 2021-05-04

## 2021-05-26 ENCOUNTER — RX RENEWAL (OUTPATIENT)
Age: 71
End: 2021-05-26

## 2021-06-07 ENCOUNTER — APPOINTMENT (OUTPATIENT)
Dept: CARDIOLOGY | Facility: CLINIC | Age: 71
End: 2021-06-07
Payer: MEDICARE

## 2021-06-07 ENCOUNTER — NON-APPOINTMENT (OUTPATIENT)
Age: 71
End: 2021-06-07

## 2021-06-07 VITALS
OXYGEN SATURATION: 98 % | SYSTOLIC BLOOD PRESSURE: 128 MMHG | TEMPERATURE: 98.1 F | BODY MASS INDEX: 25.77 KG/M2 | WEIGHT: 172 LBS | DIASTOLIC BLOOD PRESSURE: 78 MMHG | HEART RATE: 67 BPM

## 2021-06-07 VITALS — BODY MASS INDEX: 26.37 KG/M2 | WEIGHT: 176 LBS

## 2021-06-07 PROCEDURE — 36415 COLL VENOUS BLD VENIPUNCTURE: CPT

## 2021-06-07 PROCEDURE — 93000 ELECTROCARDIOGRAM COMPLETE: CPT

## 2021-06-07 PROCEDURE — 99072 ADDL SUPL MATRL&STAF TM PHE: CPT

## 2021-06-07 PROCEDURE — 99214 OFFICE O/P EST MOD 30 MIN: CPT

## 2021-06-08 LAB
ALBUMIN SERPL ELPH-MCNC: 4.3 G/DL
ALP BLD-CCNC: 63 U/L
ALT SERPL-CCNC: 15 U/L
ANION GAP SERPL CALC-SCNC: 11 MMOL/L
AST SERPL-CCNC: 15 U/L
BASOPHILS # BLD AUTO: 0.02 K/UL
BASOPHILS NFR BLD AUTO: 0.4 %
BILIRUB SERPL-MCNC: 0.3 MG/DL
BUN SERPL-MCNC: 16 MG/DL
CALCIUM SERPL-MCNC: 9.3 MG/DL
CHLORIDE SERPL-SCNC: 100 MMOL/L
CHOLEST SERPL-MCNC: 181 MG/DL
CO2 SERPL-SCNC: 26 MMOL/L
CREAT SERPL-MCNC: 1.01 MG/DL
EOSINOPHIL # BLD AUTO: 0.09 K/UL
EOSINOPHIL NFR BLD AUTO: 1.8 %
ESTIMATED AVERAGE GLUCOSE: 108 MG/DL
GLUCOSE SERPL-MCNC: 103 MG/DL
HBA1C MFR BLD HPLC: 5.4 %
HCT VFR BLD CALC: 41.2 %
HDLC SERPL-MCNC: 60 MG/DL
HGB BLD-MCNC: 13.6 G/DL
IMM GRANULOCYTES NFR BLD AUTO: 0.4 %
LDLC SERPL CALC-MCNC: 107 MG/DL
LYMPHOCYTES # BLD AUTO: 1.62 K/UL
LYMPHOCYTES NFR BLD AUTO: 32 %
MAN DIFF?: NORMAL
MCHC RBC-ENTMCNC: 30.6 PG
MCHC RBC-ENTMCNC: 33 GM/DL
MCV RBC AUTO: 92.6 FL
MONOCYTES # BLD AUTO: 0.48 K/UL
MONOCYTES NFR BLD AUTO: 9.5 %
NEUTROPHILS # BLD AUTO: 2.83 K/UL
NEUTROPHILS NFR BLD AUTO: 55.9 %
NONHDLC SERPL-MCNC: 120 MG/DL
NT-PROBNP SERPL-MCNC: 41 PG/ML
PLATELET # BLD AUTO: 223 K/UL
POTASSIUM SERPL-SCNC: 4.8 MMOL/L
PROT SERPL-MCNC: 6.8 G/DL
RBC # BLD: 4.45 M/UL
RBC # FLD: 12.8 %
SODIUM SERPL-SCNC: 137 MMOL/L
TRIGL SERPL-MCNC: 67 MG/DL
TSH SERPL-ACNC: 2.33 UIU/ML
WBC # FLD AUTO: 5.06 K/UL

## 2021-07-05 ENCOUNTER — RX RENEWAL (OUTPATIENT)
Age: 71
End: 2021-07-05

## 2021-07-25 ENCOUNTER — RX RENEWAL (OUTPATIENT)
Age: 71
End: 2021-07-25

## 2021-09-24 ENCOUNTER — APPOINTMENT (OUTPATIENT)
Dept: CARDIOLOGY | Facility: CLINIC | Age: 71
End: 2021-09-24
Payer: MEDICARE

## 2021-09-24 PROCEDURE — 93351 STRESS TTE COMPLETE: CPT

## 2021-09-24 PROCEDURE — 93320 DOPPLER ECHO COMPLETE: CPT

## 2021-09-24 PROCEDURE — 93325 DOPPLER ECHO COLOR FLOW MAPG: CPT

## 2021-09-26 NOTE — HISTORY OF PRESENT ILLNESS
[FreeTextEntry1] : November 20, 2014. Patient has been pretty healthy for most of his life. For the past year or so he notes that if he exerts himself he gets short of breath especially if strenuous. He feels like he has to stop but he does not have any chest pain. If he is walking flat he does fine but any incline or anything heavy like shoveling causes shortness of breath and he has to stop. His risk factor for coronary artery disease seems to be a family history with a father who had a stent and probably bypass surgery. His hemoglobin A1c recently was 5.8. His lipids a year ago had mildly elevated LDL and HDL in the 50s. No hypertension and no cigarette smoking. Past medical history is otherwise unremarkable. He has had no surgeries and his only hospitalization was in college for either hepatitis or mononucleosis. His wife mentions that he does snore a lot but she does not describe apneic periods. He has very mild GERD. He has never had a stress test, pulmonary function tests etc.\par December 9, 2014. Patient return for a stress echocardiogram. Exercised for 8 minutes. The target heart rate of 150 for no chest pain or significant shortness of breath. No ST changes. Echocardiogram totally normal with no evidence of ischemia.\par July 21, 2017. First visit in almost 3 years. Patient has been doing well in the interim continuing to have the same symptom of occasional shortness of breath when he is walking, stops and it goes away, and then he can continue. No exertional chest pain. No change in intensity or frequency compared with 3 years ago, when he had a normal stress echo. He was scheduled for routine preop for cataract surgery and was found to be in atrial fibrillation yesterday. No symptoms of palpitations or any change in aerobic capacity. Rate seems to be controlled without medication. He is here now for evaluation of this new problem.\par August 18, 2017. Patient returns for followup along with stress test and echocardiogram. His echocardiogram is unremarkable except for mild MR. Normal LV and RV function. His plain stress test had no ischemia, but very frequent APCs and short bursts of 4-6 beat atrial tachycardia in recovery, but no atrial fibrillation. The patient Is back in sinus rhythm. His atrial fibrillation is mostly from conduction disease with maybe some component from his MR given that he has normal LV function and no ischemia. We have multiple choices. Given his underlying conduction disease he may have more problems with sotalol then with flecainide, so we will try flecainide at only 50 mg q.12 h. Long discussion about the pros and cons of eventually coming off anticoagulation. He will followup in one month and we will reevaluate.\par September 26, 2017. Patient returns now on flecainide 50 mg q.12 h. Is in sinus bradycardia at 56 with an otherwise normal ECG. He feels fine, with no complaints.\par December 1, 2017. Patient is here in followup. He feels wonderful without complaints. Is in sinus rhythm at 63, with a normal QRS, and QT, and no side effects. He will be seeing Dr. Smalls that next week for a checkup and a flu shot\par June 1, 2018. Patient returns in followup. Remains in sinus bradycardia at 52. Tolerating the flecainide. Tolerating the Xarelto with no complaints.\par December 13, 2018. Patient returns in followup. He remains in sinus bradycardia at 56 with an otherwise normal. EKG. Will be retiring soon and has a trip planned to [place] in February. No symptoms. No change in medication. No complaints.\par April 25, 2019. Patient here in followup. Remains in sinus rhythm at 57 with acceptable QRS and QT.\par Had a great trip to [place]. Is totally relaxed and feels wonderful. No interval medical issues only getting nervous about the cost of Xarelto.\par August 28, 2019.  Patient returns for stress echocardiogram.  This time able to do a full 9 minutes without any symptoms.  No APCs or VPCs until recovery when there were occasional APCs.  No atrial fibrillation or atrial tachycardia.  No evidence of ischemia either by echo or by ECG.  Very mild AS and MR with normal LV and RV function.\par October 30, 2019.  Patient here in follow-up.  No arrhythmias, no symptoms, no medical issues, no change in medication.  EKG remains with sinus bradycardia at 56 with normal QRS and QT.  Patient planning on going to [place] in February.\par March 4, 2020.  Patient here in follow-up.  Put on a little weight while in [place].  No palpitations no symptoms.  Remains in sinus rhythm with a normal EKG.  Labs with Dr. Kidd included cholesterol 190, triglycerides 82, HDL 64, .  Hemoglobin A1c 5.5.\par August 11, 2020.  Patient here in follow-up.  No complaints.  Remains in sinus rhythm at 56 with normal QRS and QT.\par June 7, 2021.  First visit since last August.  Remains in sinus rhythm at 59 otherwise normal tracing with normal QRS and QT had labs in February including hemoglobin A1c of 5.4 that were within normal limits except cholesterol 185, HDL 57, , triglycerides 89.  No symptoms, no arrhythmias, playing [employer] in [place] without symptoms etc.  Was a little concerned that he lost a few more pounds although he does admit that they have cut out bread and cheese.  Last stress echo was August 2019.\par September 24, 2021.Patient returns for stress echocardiogram.  Still able to exercise for 9 minutes to a heart rate of 140 and a blood pressure of 188/60 although a little more fatigued.  No chest pain, no significant shortness of breath, no significant ST changes.  No echocardiographic evidence of ischemia.  No atrial arrhythmias and only occasional APCs in recovery no VPCs.  Baseline echo with chordal William and a prominent septum and slightly hyperdynamic but no significant LVOT gradient.  Mild AS.  LVEF 65 to 75% with normal RVSP and normal RV systolic function.  No significant change from 2019.  Continue flecainide.  Follow-up in December

## 2021-09-26 NOTE — REVIEW OF SYSTEMS
[Weight Loss (___ Lbs)] : [unfilled] ~Ulb weight loss [Negative] : Heme/Lymph [Feeling Fatigued] : not feeling fatigued

## 2021-09-30 ENCOUNTER — RX RENEWAL (OUTPATIENT)
Age: 71
End: 2021-09-30

## 2021-11-08 ENCOUNTER — RX RENEWAL (OUTPATIENT)
Age: 71
End: 2021-11-08

## 2021-12-13 ENCOUNTER — NON-APPOINTMENT (OUTPATIENT)
Age: 71
End: 2021-12-13

## 2021-12-13 ENCOUNTER — APPOINTMENT (OUTPATIENT)
Dept: CARDIOLOGY | Facility: CLINIC | Age: 71
End: 2021-12-13
Payer: MEDICARE

## 2021-12-13 VITALS — DIASTOLIC BLOOD PRESSURE: 68 MMHG | SYSTOLIC BLOOD PRESSURE: 138 MMHG | HEART RATE: 56 BPM

## 2021-12-13 VITALS
WEIGHT: 174.6 LBS | DIASTOLIC BLOOD PRESSURE: 73 MMHG | HEART RATE: 55 BPM | HEIGHT: 68.5 IN | BODY MASS INDEX: 26.16 KG/M2 | OXYGEN SATURATION: 100 % | SYSTOLIC BLOOD PRESSURE: 151 MMHG

## 2021-12-13 PROCEDURE — 36415 COLL VENOUS BLD VENIPUNCTURE: CPT

## 2021-12-13 PROCEDURE — 93000 ELECTROCARDIOGRAM COMPLETE: CPT

## 2021-12-13 PROCEDURE — 99214 OFFICE O/P EST MOD 30 MIN: CPT

## 2021-12-13 NOTE — HISTORY OF PRESENT ILLNESS
[FreeTextEntry1] : November 20, 2014. Patient has been pretty healthy for most of his life. For the past year or so he notes that if he exerts himself he gets short of breath especially if strenuous. He feels like he has to stop but he does not have any chest pain. If he is walking flat he does fine but any incline or anything heavy like shoveling causes shortness of breath and he has to stop. His risk factor for coronary artery disease seems to be a family history with a father who had a stent and probably bypass surgery. His hemoglobin A1c recently was 5.8. His lipids a year ago had mildly elevated LDL and HDL in the 50s. No hypertension and no cigarette smoking. Past medical history is otherwise unremarkable. He has had no surgeries and his only hospitalization was in college for either hepatitis or mononucleosis. His wife mentions that he does snore a lot but she does not describe apneic periods. He has very mild GERD. He has never had a stress test, pulmonary function tests etc.\par December 9, 2014. Patient return for a stress echocardiogram. Exercised for 8 minutes. The target heart rate of 150 for no chest pain or significant shortness of breath. No ST changes. Echocardiogram totally normal with no evidence of ischemia.\par July 21, 2017. First visit in almost 3 years. Patient has been doing well in the interim continuing to have the same symptom of occasional shortness of breath when he is walking, stops and it goes away, and then he can continue. No exertional chest pain. No change in intensity or frequency compared with 3 years ago, when he had a normal stress echo. He was scheduled for routine preop for cataract surgery and was found to be in atrial fibrillation yesterday. No symptoms of palpitations or any change in aerobic capacity. Rate seems to be controlled without medication. He is here now for evaluation of this new problem.\par August 18, 2017. Patient returns for followup along with stress test and echocardiogram. His echocardiogram is unremarkable except for mild MR. Normal LV and RV function. His plain stress test had no ischemia, but very frequent APCs and short bursts of 4-6 beat atrial tachycardia in recovery, but no atrial fibrillation. The patient Is back in sinus rhythm. His atrial fibrillation is mostly from conduction disease with maybe some component from his MR given that he has normal LV function and no ischemia. We have multiple choices. Given his underlying conduction disease he may have more problems with sotalol then with flecainide, so we will try flecainide at only 50 mg q.12 h. Long discussion about the pros and cons of eventually coming off anticoagulation. He will followup in one month and we will reevaluate.\par September 26, 2017. Patient returns now on flecainide 50 mg q.12 h. Is in sinus bradycardia at 56 with an otherwise normal ECG. He feels fine, with no complaints.\par December 1, 2017. Patient is here in followup. He feels wonderful without complaints. Is in sinus rhythm at 63, with a normal QRS, and QT, and no side effects. He will be seeing Dr. Smalls that next week for a checkup and a flu shot\par June 1, 2018. Patient returns in followup. Remains in sinus bradycardia at 52. Tolerating the flecainide. Tolerating the Xarelto with no complaints.\par December 13, 2018. Patient returns in followup. He remains in sinus bradycardia at 56 with an otherwise normal. EKG. Will be retiring soon and has a trip planned to [place] in February. No symptoms. No change in medication. No complaints.\par April 25, 2019. Patient here in followup. Remains in sinus rhythm at 57 with acceptable QRS and QT.\par Had a great trip to [place]. Is totally relaxed and feels wonderful. No interval medical issues only getting nervous about the cost of Xarelto.\par August 28, 2019.  Patient returns for stress echocardiogram.  This time able to do a full 9 minutes without any symptoms.  No APCs or VPCs until recovery when there were occasional APCs.  No atrial fibrillation or atrial tachycardia.  No evidence of ischemia either by echo or by ECG.  Very mild AS and MR with normal LV and RV function.\par October 30, 2019.  Patient here in follow-up.  No arrhythmias, no symptoms, no medical issues, no change in medication.  EKG remains with sinus bradycardia at 56 with normal QRS and QT.  Patient planning on going to [place] in February.\par March 4, 2020.  Patient here in follow-up.  Put on a little weight while in [place].  No palpitations no symptoms.  Remains in sinus rhythm with a normal EKG.  Labs with Dr. Kidd included cholesterol 190, triglycerides 82, HDL 64, .  Hemoglobin A1c 5.5.\par August 11, 2020.  Patient here in follow-up.  No complaints.  Remains in sinus rhythm at 56 with normal QRS and QT.\par June 7, 2021.  First visit since last August.  Remains in sinus rhythm at 59 otherwise normal tracing with normal QRS and QT had labs in February including hemoglobin A1c of 5.4 that were within normal limits except cholesterol 185, HDL 57, , triglycerides 89.  No symptoms, no arrhythmias, playing [employer] in [place] without symptoms etc.  Was a little concerned that he lost a few more pounds although he does admit that they have cut out bread and cheese.  Last stress echo was August 2019.\par September 24, 2021.Patient returns for stress echocardiogram.  Still able to exercise for 9 minutes to a heart rate of 140 and a blood pressure of 188/60 although a little more fatigued.  No chest pain, no significant shortness of breath, no significant ST changes.  No echocardiographic evidence of ischemia.  No atrial arrhythmias and only occasional APCs in recovery no VPCs.  Baseline echo with chordal SULY and a prominent septum and slightly hyperdynamic but no significant LVOT gradient.  Mild AS.  LVEF 65 to 75% with normal RVSP and normal RV systolic function.  No significant change from 2019.  Continue flecainide.  Follow-up in December.\par December 13, 2021.  Patient here in follow-up.  Remains in sinus bradycardia at 58 with an otherwise normal tracing and normal QRS and QT.  1 APC noted.  Good spirits, no complaints.  Not sure if he will be going to [place] or not.  Had his flu shot already.  Blood pressure initially a little high but did come down somewhat by the end and he did have a normal blood pressure response on his stress test in September.  Weight is about the same.

## 2021-12-13 NOTE — PHYSICAL EXAM
[General Appearance - Well Developed] : well developed [Normal Appearance] : normal appearance [Well Groomed] : well groomed [General Appearance - Well Nourished] : well nourished [No Deformities] : no deformities [General Appearance - In No Acute Distress] : no acute distress [Normal Conjunctiva] : the conjunctiva exhibited no abnormalities [Eyelids - No Xanthelasma] : the eyelids demonstrated no xanthelasmas [Normal Oral Mucosa] : normal oral mucosa [No Oral Pallor] : no oral pallor [No Oral Cyanosis] : no oral cyanosis [Normal Jugular Venous A Waves Present] : normal jugular venous A waves present [Normal Jugular Venous V Waves Present] : normal jugular venous V waves present [No Jugular Venous Lehman A Waves] : no jugular venous lehman A waves [Exaggerated Use Of Accessory Muscles For Inspiration] : no accessory muscle use [Respiration, Rhythm And Depth] : normal respiratory rhythm and effort [Auscultation Breath Sounds / Voice Sounds] : lungs were clear to auscultation bilaterally [Heart Rate And Rhythm] : heart rate and rhythm were normal [Heart Sounds] : normal S1 and S2 [Murmurs] : no murmurs present [Abdomen Soft] : soft [Abdomen Tenderness] : non-tender [Abdomen Mass (___ Cm)] : no abdominal mass palpated [Abnormal Walk] : normal gait [Gait - Sufficient For Exercise Testing] : the gait was sufficient for exercise testing [Nail Clubbing] : no clubbing of the fingernails [Cyanosis, Localized] : no localized cyanosis [Petechial Hemorrhages (___cm)] : no petechial hemorrhages [FreeTextEntry1] : No edema. Pulses 2+ bilaterally symmetrical including pedal [Skin Color & Pigmentation] : normal skin color and pigmentation [] : no rash [No Venous Stasis] : no venous stasis [Skin Lesions] : no skin lesions [No Skin Ulcers] : no skin ulcer [No Xanthoma] : no  xanthoma was observed [Oriented To Time, Place, And Person] : oriented to person, place, and time [Affect] : the affect was normal [Mood] : the mood was normal [No Anxiety] : not feeling anxious

## 2021-12-13 NOTE — REVIEW OF SYSTEMS
[Feeling Fatigued] : not feeling fatigued [Weight Loss (___ Lbs)] : no recent weight loss [Negative] : Heme/Lymph

## 2021-12-16 ENCOUNTER — NON-APPOINTMENT (OUTPATIENT)
Age: 71
End: 2021-12-16

## 2021-12-16 LAB
ALBUMIN SERPL ELPH-MCNC: 4.3 G/DL
ALP BLD-CCNC: 85 U/L
ALT SERPL-CCNC: 17 U/L
ANION GAP SERPL CALC-SCNC: 12 MMOL/L
AST SERPL-CCNC: 16 U/L
BASOPHILS # BLD AUTO: 0.04 K/UL
BASOPHILS NFR BLD AUTO: 0.8 %
BILIRUB SERPL-MCNC: 0.4 MG/DL
BUN SERPL-MCNC: 17 MG/DL
CALCIUM SERPL-MCNC: 9.2 MG/DL
CHLORIDE SERPL-SCNC: 101 MMOL/L
CHOLEST SERPL-MCNC: 185 MG/DL
CO2 SERPL-SCNC: 24 MMOL/L
CREAT SERPL-MCNC: 0.96 MG/DL
EOSINOPHIL # BLD AUTO: 0.16 K/UL
EOSINOPHIL NFR BLD AUTO: 3.1 %
ESTIMATED AVERAGE GLUCOSE: 108 MG/DL
GLUCOSE SERPL-MCNC: 103 MG/DL
HBA1C MFR BLD HPLC: 5.4 %
HCT VFR BLD CALC: 40.5 %
HDLC SERPL-MCNC: 67 MG/DL
HGB BLD-MCNC: 13.1 G/DL
IMM GRANULOCYTES NFR BLD AUTO: 0.2 %
LDLC SERPL CALC-MCNC: 108 MG/DL
LYMPHOCYTES # BLD AUTO: 1.78 K/UL
LYMPHOCYTES NFR BLD AUTO: 34.7 %
MAN DIFF?: NORMAL
MCHC RBC-ENTMCNC: 30 PG
MCHC RBC-ENTMCNC: 32.3 GM/DL
MCV RBC AUTO: 92.7 FL
MONOCYTES # BLD AUTO: 0.4 K/UL
MONOCYTES NFR BLD AUTO: 7.8 %
NEUTROPHILS # BLD AUTO: 2.74 K/UL
NEUTROPHILS NFR BLD AUTO: 53.4 %
NONHDLC SERPL-MCNC: 117 MG/DL
NT-PROBNP SERPL-MCNC: 74 PG/ML
PLATELET # BLD AUTO: 230 K/UL
POTASSIUM SERPL-SCNC: 4.9 MMOL/L
PROT SERPL-MCNC: 6.9 G/DL
RBC # BLD: 4.37 M/UL
RBC # FLD: 12.6 %
SODIUM SERPL-SCNC: 138 MMOL/L
TRIGL SERPL-MCNC: 48 MG/DL
WBC # FLD AUTO: 5.13 K/UL

## 2022-03-02 ENCOUNTER — APPOINTMENT (OUTPATIENT)
Dept: INTERNAL MEDICINE | Facility: CLINIC | Age: 72
End: 2022-03-02
Payer: MEDICARE

## 2022-03-02 VITALS
DIASTOLIC BLOOD PRESSURE: 62 MMHG | SYSTOLIC BLOOD PRESSURE: 138 MMHG | HEART RATE: 59 BPM | WEIGHT: 172 LBS | TEMPERATURE: 98.3 F | OXYGEN SATURATION: 99 % | HEIGHT: 68 IN | BODY MASS INDEX: 26.07 KG/M2

## 2022-03-02 DIAGNOSIS — N40.1 BENIGN PROSTATIC HYPERPLASIA WITH LOWER URINARY TRACT SYMPMS: ICD-10-CM

## 2022-03-02 DIAGNOSIS — R35.1 BENIGN PROSTATIC HYPERPLASIA WITH LOWER URINARY TRACT SYMPMS: ICD-10-CM

## 2022-03-02 PROCEDURE — G0444 DEPRESSION SCREEN ANNUAL: CPT | Mod: 59

## 2022-03-02 PROCEDURE — G0439: CPT

## 2022-03-02 PROCEDURE — 36415 COLL VENOUS BLD VENIPUNCTURE: CPT

## 2022-03-03 NOTE — HEALTH RISK ASSESSMENT
[No] : In the past 12 months have you used drugs other than those required for medical reasons? No [Patient reported colonoscopy was normal] : Patient reported colonoscopy was normal [None] : None [Retired] : retired [Fully functional (bathing, dressing, toileting, transferring, walking, feeding)] : Fully functional (bathing, dressing, toileting, transferring, walking, feeding) [] :  [Fully functional (using the telephone, shopping, preparing meals, housekeeping, doing laundry, using] : Fully functional and needs no help or supervision to perform IADLs (using the telephone, shopping, preparing meals, housekeeping, doing laundry, using transportation, managing medications and managing finances) [ColonoscopyDate] : 12/17

## 2022-03-03 NOTE — HISTORY OF PRESENT ILLNESS
[de-identified] : 73 y/o man presents for annual physical exam. he feels well overall currently w no new concerns. no recent illnesses.\par he had COVID vaccines x 3 doses. recently more active, mild exercise, improved his diet. \par \par afib rate/rhythm controlled on flecainide, following w Dr Henderson, continues on Xarelto for anticoagulation without problems , no new events, echo imaging and stress testing w no evidence of ischemia , most recent lab testing reviewed and wnl including BNP level.\par chronic BPH symptoms w nocturia on terazosin for years, symptoms stable , PSA wnl \par \par last colonoscopy possibly 2017 w Dr Dutta, pt recalls normal results

## 2022-03-03 NOTE — ASSESSMENT
[FreeTextEntry1] : discussed w pt \par \par cont current rx\par \par reviewed most recent labs from 12/21. will repeat labs and add PSA level for screening \par \par weight is stable\par \par  cont to monitor symptoms of BPH , monitor PSA \par \par reviewed vaccines, UTD including COVID vaccines and Shingrix \par \par reminded him re checking with Dr Dutta GI as to colonoscopy recommendations, consider Cologuard testing as alternative \par \par f/u w Dr Henderson as scheduled , cont anticoagulation\par \par RTO yearly for routine exam or earlier prn if any new concerns

## 2022-04-08 ENCOUNTER — RX RENEWAL (OUTPATIENT)
Age: 72
End: 2022-04-08

## 2022-04-08 RX ORDER — RIVAROXABAN 20 MG/1
20 TABLET, FILM COATED ORAL
Qty: 90 | Refills: 3 | Status: ACTIVE | COMMUNITY
Start: 2017-08-18 | End: 1900-01-01

## 2022-04-11 PROBLEM — Z11.59 SCREENING FOR VIRAL DISEASE: Status: ACTIVE | Noted: 2021-02-17

## 2022-04-14 ENCOUNTER — APPOINTMENT (OUTPATIENT)
Dept: CARDIOLOGY | Facility: CLINIC | Age: 72
End: 2022-04-14
Payer: MEDICARE

## 2022-04-14 ENCOUNTER — NON-APPOINTMENT (OUTPATIENT)
Age: 72
End: 2022-04-14

## 2022-04-14 VITALS
HEART RATE: 64 BPM | OXYGEN SATURATION: 100 % | DIASTOLIC BLOOD PRESSURE: 58 MMHG | BODY MASS INDEX: 25.7 KG/M2 | SYSTOLIC BLOOD PRESSURE: 111 MMHG | WEIGHT: 169 LBS

## 2022-04-14 PROCEDURE — 93000 ELECTROCARDIOGRAM COMPLETE: CPT

## 2022-04-14 PROCEDURE — 99214 OFFICE O/P EST MOD 30 MIN: CPT

## 2022-04-14 NOTE — REVIEW OF SYSTEMS
[Weight Gain (___ Lbs)] : [unfilled] ~Ulb weight gain [Negative] : Heme/Lymph [Feeling Fatigued] : not feeling fatigued [Weight Loss (___ Lbs)] : no recent weight loss

## 2022-04-14 NOTE — HISTORY OF PRESENT ILLNESS
[FreeTextEntry1] : November 20, 2014. Patient has been pretty healthy for most of his life. For the past year or so he notes that if he exerts himself he gets short of breath especially if strenuous. He feels like he has to stop but he does not have any chest pain. If he is walking flat he does fine but any incline or anything heavy like shoveling causes shortness of breath and he has to stop. His risk factor for coronary artery disease seems to be a family history with a father who had a stent and probably bypass surgery. His hemoglobin A1c recently was 5.8. His lipids a year ago had mildly elevated LDL and HDL in the 50s. No hypertension and no cigarette smoking. Past medical history is otherwise unremarkable. He has had no surgeries and his only hospitalization was in college for either hepatitis or mononucleosis. His wife mentions that he does snore a lot but she does not describe apneic periods. He has very mild GERD. He has never had a stress test, pulmonary function tests etc.\par December 9, 2014. Patient return for a stress echocardiogram. Exercised for 8 minutes. The target heart rate of 150 for no chest pain or significant shortness of breath. No ST changes. Echocardiogram totally normal with no evidence of ischemia.\par July 21, 2017. First visit in almost 3 years. Patient has been doing well in the interim continuing to have the same symptom of occasional shortness of breath when he is walking, stops and it goes away, and then he can continue. No exertional chest pain. No change in intensity or frequency compared with 3 years ago, when he had a normal stress echo. He was scheduled for routine preop for cataract surgery and was found to be in atrial fibrillation yesterday. No symptoms of palpitations or any change in aerobic capacity. Rate seems to be controlled without medication. He is here now for evaluation of this new problem.\par August 18, 2017. Patient returns for followup along with stress test and echocardiogram. His echocardiogram is unremarkable except for mild MR. Normal LV and RV function. His plain stress test had no ischemia, but very frequent APCs and short bursts of 4-6 beat atrial tachycardia in recovery, but no atrial fibrillation. The patient Is back in sinus rhythm. His atrial fibrillation is mostly from conduction disease with maybe some component from his MR given that he has normal LV function and no ischemia. We have multiple choices. Given his underlying conduction disease he may have more problems with sotalol then with flecainide, so we will try flecainide at only 50 mg q.12 h. Long discussion about the pros and cons of eventually coming off anticoagulation. He will followup in one month and we will reevaluate.\par September 26, 2017. Patient returns now on flecainide 50 mg q.12 h. Is in sinus bradycardia at 56 with an otherwise normal ECG. He feels fine, with no complaints.\par December 1, 2017. Patient is here in followup. He feels wonderful without complaints. Is in sinus rhythm at 63, with a normal QRS, and QT, and no side effects. He will be seeing Dr. Smalls that next week for a checkup and a flu shot\par June 1, 2018. Patient returns in followup. Remains in sinus bradycardia at 52. Tolerating the flecainide. Tolerating the Xarelto with no complaints.\par December 13, 2018. Patient returns in followup. He remains in sinus bradycardia at 56 with an otherwise normal. EKG. Will be retiring soon and has a trip planned to [place] in February. No symptoms. No change in medication. No complaints.\par April 25, 2019. Patient here in followup. Remains in sinus rhythm at 57 with acceptable QRS and QT.\par Had a great trip to [place]. Is totally relaxed and feels wonderful. No interval medical issues only getting nervous about the cost of Xarelto.\par August 28, 2019.  Patient returns for stress echocardiogram.  This time able to do a full 9 minutes without any symptoms.  No APCs or VPCs until recovery when there were occasional APCs.  No atrial fibrillation or atrial tachycardia.  No evidence of ischemia either by echo or by ECG.  Very mild AS and MR with normal LV and RV function.\par October 30, 2019.  Patient here in follow-up.  No arrhythmias, no symptoms, no medical issues, no change in medication.  EKG remains with sinus bradycardia at 56 with normal QRS and QT.  Patient planning on going to [place] in February.\par March 4, 2020.  Patient here in follow-up.  Put on a little weight while in [place].  No palpitations no symptoms.  Remains in sinus rhythm with a normal EKG.  Labs with Dr. Kidd included cholesterol 190, triglycerides 82, HDL 64, .  Hemoglobin A1c 5.5.\par August 11, 2020.  Patient here in follow-up.  No complaints.  Remains in sinus rhythm at 56 with normal QRS and QT.\par June 7, 2021.  First visit since last August.  Remains in sinus rhythm at 59 otherwise normal tracing with normal QRS and QT had labs in February including hemoglobin A1c of 5.4 that were within normal limits except cholesterol 185, HDL 57, , triglycerides 89.  No symptoms, no arrhythmias, playing [employer] ball in [place] without symptoms etc.  Was a little concerned that he lost a few more pounds although he does admit that they have cut out bread and cheese.  Last stress echo was August 2019.\par September 24, 2021.Patient returns for stress echocardiogram.  Still able to exercise for 9 minutes to a heart rate of 140 and a blood pressure of 188/60 although a little more fatigued.  No chest pain, no significant shortness of breath, no significant ST changes.  No echocardiographic evidence of ischemia.  No atrial arrhythmias and only occasional APCs in recovery no VPCs.  Baseline echo with chordal SULY and a prominent septum and slightly hyperdynamic but no significant LVOT gradient.  Mild AS.  LVEF 65 to 75% with normal RVSP and normal RV systolic function.  No significant change from 2019.  Continue flecainide.  Follow-up in December.\par December 13, 2021.  Patient here in follow-up.  Remains in sinus bradycardia at 58 with an otherwise normal tracing and normal QRS and QT.  1 APC noted.  Good spirits, no complaints.  Not sure if he will be going to [place] or not.  Had his flu shot already.  Blood pressure initially a little high but did come down somewhat by the end and he did have a normal blood pressure response on his stress test in September.  Weight is about the same.\par April 14, 2022.  Patient here in follow-up.  Remains in sinus rhythm at 60 with normal QRS and QTc.  Had labs with Dr. Kidd on March 2.  Hemoglobin A1c 5.5.  Cholesterol 177, triglycerides 52, HDL 71, LDL 96.  Normal chemistries, normal LFTs, normal thyroid function.  No complaints, no episodes of palpitations, active exercising etc.  Weight has come down some.  No COVID issues and is fully vaccinated with 1 booster.

## 2022-04-24 ENCOUNTER — RX RENEWAL (OUTPATIENT)
Age: 72
End: 2022-04-24

## 2022-06-28 ENCOUNTER — RX RENEWAL (OUTPATIENT)
Age: 72
End: 2022-06-28

## 2022-06-28 LAB
ALBUMIN SERPL ELPH-MCNC: 4.8 G/DL
ALP BLD-CCNC: 70 U/L
ALT SERPL-CCNC: 16 U/L
ANION GAP SERPL CALC-SCNC: 12 MMOL/L
APPEARANCE: CLEAR
AST SERPL-CCNC: 16 U/L
BASOPHILS # BLD AUTO: 0.04 K/UL
BASOPHILS NFR BLD AUTO: 0.6 %
BILIRUB SERPL-MCNC: 0.5 MG/DL
BILIRUBIN URINE: NEGATIVE
BLOOD URINE: NEGATIVE
BUN SERPL-MCNC: 14 MG/DL
CALCIUM SERPL-MCNC: 9.8 MG/DL
CHLORIDE SERPL-SCNC: 99 MMOL/L
CHOLEST SERPL-MCNC: 177 MG/DL
CO2 SERPL-SCNC: 26 MMOL/L
COLOR: NORMAL
COVID-19 SPIKE DOMAIN ANTIBODY INTERPRETATION: POSITIVE
CREAT SERPL-MCNC: 0.88 MG/DL
EGFR: 91 ML/MIN/1.73M2
EOSINOPHIL # BLD AUTO: 0.17 K/UL
EOSINOPHIL NFR BLD AUTO: 2.7 %
ESTIMATED AVERAGE GLUCOSE: 111 MG/DL
GLUCOSE QUALITATIVE U: NEGATIVE
GLUCOSE SERPL-MCNC: 103 MG/DL
HBA1C MFR BLD HPLC: 5.5 %
HCT VFR BLD CALC: 39.4 %
HDLC SERPL-MCNC: 71 MG/DL
HGB BLD-MCNC: 12.7 G/DL
IMM GRANULOCYTES NFR BLD AUTO: 0.3 %
KETONES URINE: NEGATIVE
LDLC SERPL CALC-MCNC: 96 MG/DL
LEUKOCYTE ESTERASE URINE: NEGATIVE
LYMPHOCYTES # BLD AUTO: 2.07 K/UL
LYMPHOCYTES NFR BLD AUTO: 32.4 %
MAN DIFF?: NORMAL
MCHC RBC-ENTMCNC: 30 PG
MCHC RBC-ENTMCNC: 32.2 GM/DL
MCV RBC AUTO: 93.1 FL
MONOCYTES # BLD AUTO: 0.38 K/UL
MONOCYTES NFR BLD AUTO: 5.9 %
NEUTROPHILS # BLD AUTO: 3.71 K/UL
NEUTROPHILS NFR BLD AUTO: 58.1 %
NITRITE URINE: NEGATIVE
NONHDLC SERPL-MCNC: 106 MG/DL
PH URINE: 6
PLATELET # BLD AUTO: 224 K/UL
POTASSIUM SERPL-SCNC: 4.3 MMOL/L
PROT SERPL-MCNC: 7.3 G/DL
PROTEIN URINE: NEGATIVE
PSA SERPL-MCNC: 3.69 NG/ML
RBC # BLD: 4.23 M/UL
RBC # FLD: 13.5 %
SARS-COV-2 AB SERPL IA-ACNC: >250 U/ML
SODIUM SERPL-SCNC: 137 MMOL/L
SPECIFIC GRAVITY URINE: 1.01
T4 FREE SERPL-MCNC: 1.3 NG/DL
TRIGL SERPL-MCNC: 52 MG/DL
TSH SERPL-ACNC: 1.84 UIU/ML
UROBILINOGEN URINE: NORMAL
WBC # FLD AUTO: 6.39 K/UL

## 2022-10-12 ENCOUNTER — APPOINTMENT (OUTPATIENT)
Dept: CARDIOLOGY | Facility: CLINIC | Age: 72
End: 2022-10-12

## 2022-11-02 ENCOUNTER — APPOINTMENT (OUTPATIENT)
Dept: CARDIOLOGY | Facility: CLINIC | Age: 72
End: 2022-11-02

## 2022-11-02 ENCOUNTER — NON-APPOINTMENT (OUTPATIENT)
Age: 72
End: 2022-11-02

## 2022-11-02 VITALS
SYSTOLIC BLOOD PRESSURE: 160 MMHG | HEART RATE: 57 BPM | BODY MASS INDEX: 25.91 KG/M2 | OXYGEN SATURATION: 100 % | WEIGHT: 171 LBS | DIASTOLIC BLOOD PRESSURE: 76 MMHG | HEIGHT: 68 IN | RESPIRATION RATE: 17 BRPM

## 2022-11-02 VITALS — HEART RATE: 54 BPM | DIASTOLIC BLOOD PRESSURE: 70 MMHG | SYSTOLIC BLOOD PRESSURE: 146 MMHG

## 2022-11-02 PROCEDURE — 93000 ELECTROCARDIOGRAM COMPLETE: CPT

## 2022-11-02 PROCEDURE — 36415 COLL VENOUS BLD VENIPUNCTURE: CPT

## 2022-11-02 PROCEDURE — 99214 OFFICE O/P EST MOD 30 MIN: CPT | Mod: 25

## 2022-11-02 NOTE — HISTORY OF PRESENT ILLNESS
[FreeTextEntry1] : November 20, 2014. Patient has been pretty healthy for most of his life. For the past year or so he notes that if he exerts himself he gets short of breath especially if strenuous. He feels like he has to stop but he does not have any chest pain. If he is walking flat he does fine but any incline or anything heavy like shoveling causes shortness of breath and he has to stop. His risk factor for coronary artery disease seems to be a family history with a father who had a stent and probably bypass surgery. His hemoglobin A1c recently was 5.8. His lipids a year ago had mildly elevated LDL and HDL in the 50s. No hypertension and no cigarette smoking. Past medical history is otherwise unremarkable. He has had no surgeries and his only hospitalization was in college for either hepatitis or mononucleosis. His wife mentions that he does snore a lot but she does not describe apneic periods. He has very mild GERD. He has never had a stress test, pulmonary function tests etc.\par December 9, 2014. Patient return for a stress echocardiogram. Exercised for 8 minutes. The target heart rate of 150 for no chest pain or significant shortness of breath. No ST changes. Echocardiogram totally normal with no evidence of ischemia.\par July 21, 2017. First visit in almost 3 years. Patient has been doing well in the interim continuing to have the same symptom of occasional shortness of breath when he is walking, stops and it goes away, and then he can continue. No exertional chest pain. No change in intensity or frequency compared with 3 years ago, when he had a normal stress echo. He was scheduled for routine preop for cataract surgery and was found to be in atrial fibrillation yesterday. No symptoms of palpitations or any change in aerobic capacity. Rate seems to be controlled without medication. He is here now for evaluation of this new problem.\par August 18, 2017. Patient returns for followup along with stress test and echocardiogram. His echocardiogram is unremarkable except for mild MR. Normal LV and RV function. His plain stress test had no ischemia, but very frequent APCs and short bursts of 4-6 beat atrial tachycardia in recovery, but no atrial fibrillation. The patient Is back in sinus rhythm. His atrial fibrillation is mostly from conduction disease with maybe some component from his MR given that he has normal LV function and no ischemia. We have multiple choices. Given his underlying conduction disease he may have more problems with sotalol then with flecainide, so we will try flecainide at only 50 mg q.12 h. Long discussion about the pros and cons of eventually coming off anticoagulation. He will followup in one month and we will reevaluate.\par September 26, 2017. Patient returns now on flecainide 50 mg q.12 h. Is in sinus bradycardia at 56 with an otherwise normal ECG. He feels fine, with no complaints.\par December 1, 2017. Patient is here in followup. He feels wonderful without complaints. Is in sinus rhythm at 63, with a normal QRS, and QT, and no side effects. He will be seeing Dr. Smalls that next week for a checkup and a flu shot\par June 1, 2018. Patient returns in followup. Remains in sinus bradycardia at 52. Tolerating the flecainide. Tolerating the Xarelto with no complaints.\par December 13, 2018. Patient returns in followup. He remains in sinus bradycardia at 56 with an otherwise normal. EKG. Will be retiring soon and has a trip planned to [place] in February. No symptoms. No change in medication. No complaints.\par April 25, 2019. Patient here in followup. Remains in sinus rhythm at 57 with acceptable QRS and QT.\par Had a great trip to [place]. Is totally relaxed and feels wonderful. No interval medical issues only getting nervous about the cost of Xarelto.\par August 28, 2019.  Patient returns for stress echocardiogram.  This time able to do a full 9 minutes without any symptoms.  No APCs or VPCs until recovery when there were occasional APCs.  No atrial fibrillation or atrial tachycardia.  No evidence of ischemia either by echo or by ECG.  Very mild AS and MR with normal LV and RV function.\par October 30, 2019.  Patient here in follow-up.  No arrhythmias, no symptoms, no medical issues, no change in medication.  EKG remains with sinus bradycardia at 56 with normal QRS and QT.  Patient planning on going to [place] in February.\par March 4, 2020.  Patient here in follow-up.  Put on a little weight while in [place].  No palpitations no symptoms.  Remains in sinus rhythm with a normal EKG.  Labs with Dr. Kidd included cholesterol 190, triglycerides 82, HDL 64, .  Hemoglobin A1c 5.5.\par August 11, 2020.  Patient here in follow-up.  No complaints.  Remains in sinus rhythm at 56 with normal QRS and QT.\par June 7, 2021.  First visit since last August.  Remains in sinus rhythm at 59 otherwise normal tracing with normal QRS and QT had labs in February including hemoglobin A1c of 5.4 that were within normal limits except cholesterol 185, HDL 57, , triglycerides 89.  No symptoms, no arrhythmias, playing [employer] ball in [place] without symptoms etc.  Was a little concerned that he lost a few more pounds although he does admit that they have cut out bread and cheese.  Last stress echo was August 2019.\par September 24, 2021.Patient returns for stress echocardiogram.  Still able to exercise for 9 minutes to a heart rate of 140 and a blood pressure of 188/60 although a little more fatigued.  No chest pain, no significant shortness of breath, no significant ST changes.  No echocardiographic evidence of ischemia.  No atrial arrhythmias and only occasional APCs in recovery no VPCs.  Baseline echo with chordal SULY and a prominent septum and slightly hyperdynamic but no significant LVOT gradient.  Mild AS.  LVEF 65 to 75% with normal RVSP and normal RV systolic function.  No significant change from 2019.  Continue flecainide.  Follow-up in December.\par December 13, 2021.  Patient here in follow-up.  Remains in sinus bradycardia at 58 with an otherwise normal tracing and normal QRS and QT.  1 APC noted.  Good spirits, no complaints.  Not sure if he will be going to [place] or not.  Had his flu shot already.  Blood pressure initially a little high but did come down somewhat by the end and he did have a normal blood pressure response on his stress test in September.  Weight is about the same.\par April 14, 2022.  Patient here in follow-up.  Remains in sinus rhythm at 60 with normal QRS and QTc.  Had labs with Dr. Kidd on March 2.  Hemoglobin A1c 5.5.  Cholesterol 177, triglycerides 52, HDL 71, LDL 96.  Normal chemistries, normal LFTs, normal thyroid function.  No complaints, no episodes of palpitations, active exercising etc.  Weight has come down some.  No COVID issues and is fully vaccinated with 1 booster.\par November 2, 2022.  Patient returns in follow-up.  Remains in sinus rhythm at 56 with a normal EKG including normal QRS and QT.  Initial blood pressure was elevated but on repeat was coming down by the end.  No interval medical issues and no COVID issues.  Still on flecainide without any problem.

## 2022-11-02 NOTE — REVIEW OF SYSTEMS
[Negative] : Heme/Lymph [Weight Gain (___ Lbs)] : no recent weight gain [Feeling Fatigued] : not feeling fatigued [Weight Loss (___ Lbs)] : no recent weight loss

## 2022-11-06 LAB
ALBUMIN SERPL ELPH-MCNC: 4.2 G/DL
ALP BLD-CCNC: 68 U/L
ALT SERPL-CCNC: 16 U/L
ANION GAP SERPL CALC-SCNC: 12 MMOL/L
AST SERPL-CCNC: 18 U/L
BASOPHILS # BLD AUTO: 0.05 K/UL
BASOPHILS NFR BLD AUTO: 0.9 %
BILIRUB SERPL-MCNC: 0.4 MG/DL
BUN SERPL-MCNC: 15 MG/DL
CALCIUM SERPL-MCNC: 9.3 MG/DL
CHLORIDE SERPL-SCNC: 102 MMOL/L
CHOLEST SERPL-MCNC: 173 MG/DL
CO2 SERPL-SCNC: 26 MMOL/L
CREAT SERPL-MCNC: 0.96 MG/DL
EGFR: 84 ML/MIN/1.73M2
EOSINOPHIL # BLD AUTO: 0.19 K/UL
EOSINOPHIL NFR BLD AUTO: 3.5 %
ESTIMATED AVERAGE GLUCOSE: 117 MG/DL
GLUCOSE SERPL-MCNC: 89 MG/DL
HBA1C MFR BLD HPLC: 5.7 %
HCT VFR BLD CALC: 37.1 %
HDLC SERPL-MCNC: 64 MG/DL
HGB BLD-MCNC: 12.1 G/DL
IMM GRANULOCYTES NFR BLD AUTO: 0.2 %
LDLC SERPL CALC-MCNC: 93 MG/DL
LYMPHOCYTES # BLD AUTO: 1.5 K/UL
LYMPHOCYTES NFR BLD AUTO: 27.3 %
MAN DIFF?: NORMAL
MCHC RBC-ENTMCNC: 30.5 PG
MCHC RBC-ENTMCNC: 32.6 GM/DL
MCV RBC AUTO: 93.5 FL
MONOCYTES # BLD AUTO: 0.5 K/UL
MONOCYTES NFR BLD AUTO: 9.1 %
NEUTROPHILS # BLD AUTO: 3.25 K/UL
NEUTROPHILS NFR BLD AUTO: 59 %
NONHDLC SERPL-MCNC: 110 MG/DL
PLATELET # BLD AUTO: 211 K/UL
POTASSIUM SERPL-SCNC: 4.8 MMOL/L
PROT SERPL-MCNC: 6.7 G/DL
RBC # BLD: 3.97 M/UL
RBC # FLD: 13.1 %
SODIUM SERPL-SCNC: 140 MMOL/L
TRIGL SERPL-MCNC: 83 MG/DL
TSH SERPL-ACNC: 2.18 UIU/ML
WBC # FLD AUTO: 5.5 K/UL

## 2022-12-29 ENCOUNTER — RX RENEWAL (OUTPATIENT)
Age: 72
End: 2022-12-29

## 2023-04-26 ENCOUNTER — RX RENEWAL (OUTPATIENT)
Age: 73
End: 2023-04-26

## 2023-05-10 ENCOUNTER — APPOINTMENT (OUTPATIENT)
Dept: CARDIOLOGY | Facility: CLINIC | Age: 73
End: 2023-05-10
Payer: MEDICARE

## 2023-05-10 ENCOUNTER — NON-APPOINTMENT (OUTPATIENT)
Age: 73
End: 2023-05-10

## 2023-05-10 VITALS — DIASTOLIC BLOOD PRESSURE: 60 MMHG | HEART RATE: 60 BPM | SYSTOLIC BLOOD PRESSURE: 130 MMHG

## 2023-05-10 VITALS
WEIGHT: 172 LBS | DIASTOLIC BLOOD PRESSURE: 74 MMHG | OXYGEN SATURATION: 98 % | HEART RATE: 62 BPM | SYSTOLIC BLOOD PRESSURE: 140 MMHG | BODY MASS INDEX: 26.15 KG/M2

## 2023-05-10 DIAGNOSIS — I49.8 OTHER SPECIFIED CARDIAC ARRHYTHMIAS: ICD-10-CM

## 2023-05-10 DIAGNOSIS — I48.91 UNSPECIFIED ATRIAL FIBRILLATION: ICD-10-CM

## 2023-05-10 PROCEDURE — 99214 OFFICE O/P EST MOD 30 MIN: CPT | Mod: 25

## 2023-05-10 PROCEDURE — 93000 ELECTROCARDIOGRAM COMPLETE: CPT

## 2023-05-10 PROCEDURE — 36415 COLL VENOUS BLD VENIPUNCTURE: CPT

## 2023-05-10 NOTE — HISTORY OF PRESENT ILLNESS
[FreeTextEntry1] : November [birth year], 2014. Patient has been pretty healthy for most of his life. For the past year or so he notes that if he exerts himself he gets short of breath especially if strenuous. He feels like he has to stop but he does not have any chest pain. If he is walking flat he does fine but any incline or anything heavy like shoveling causes shortness of breath and he has to stop. His risk factor for coronary artery disease seems to be a family history with a father who had a stent and probably bypass surgery. His hemoglobin A1c recently was 5.8. His lipids a year ago had mildly elevated LDL and HDL in the 50s. No hypertension and no cigarette smoking. Past medical history is otherwise unremarkable. He has had no surgeries and his only hospitalization was in college for either hepatitis or mononucleosis. His wife mentions that he does snore a lot but she does not describe apneic periods. He has very mild GERD. He has never had a stress test, pulmonary function tests etc.\par December 9, 2014. Patient return for a stress echocardiogram. Exercised for 8 minutes. The target heart rate of 150 for no chest pain or significant shortness of breath. No ST changes. Echocardiogram totally normal with no evidence of ischemia.\par July 21, 2017. First visit in almost 3 years. Patient has been doing well in the interim continuing to have the same symptom of occasional shortness of breath when he is walking, stops and it goes away, and then he can continue. No exertional chest pain. No change in intensity or frequency compared with 3 years ago, when he had a normal stress echo. He was scheduled for routine preop for cataract surgery and was found to be in atrial fibrillation yesterday. No symptoms of palpitations or any change in aerobic capacity. Rate seems to be controlled without medication. He is here now for evaluation of this new problem.\par August 18, 2017. Patient returns for followup along with stress test and echocardiogram. His echocardiogram is unremarkable except for mild MR. Normal LV and RV function. His plain stress test had no ischemia, but very frequent APCs and short bursts of 4-6 beat atrial tachycardia in recovery, but no atrial fibrillation. The patient Is back in sinus rhythm. His atrial fibrillation is mostly from conduction disease with maybe some component from his MR given that he has normal LV function and no ischemia. We have multiple choices. Given his underlying conduction disease he may have more problems with sotalol then with flecainide, so we will try flecainide at only 50 mg q.12 h. Long discussion about the pros and cons of eventually coming off anticoagulation. He will followup in one month and we will reevaluate.\par September 26, 2017. Patient returns now on flecainide 50 mg q.12 h. Is in sinus bradycardia at 56 with an otherwise normal ECG. He feels fine, with no complaints.\par December 1, 2017. Patient is here in followup. He feels wonderful without complaints. Is in sinus rhythm at 63, with a normal QRS, and QT, and no side effects. He will be seeing Dr. Smalls that next week for a checkup and a flu shot\par June 1, 2018. Patient returns in followup. Remains in sinus bradycardia at 52. Tolerating the flecainide. Tolerating the Xarelto with no complaints.\par December 13, 2018. Patient returns in followup. He remains in sinus bradycardia at 56 with an otherwise normal. EKG. Will be retiring soon and has a trip planned to [place] in February. No symptoms. No change in medication. No complaints.\par April 25, 2019. Patient here in followup. Remains in sinus rhythm at 57 with acceptable QRS and QT.\par Had a great trip to [place]. Is totally relaxed and feels wonderful. No interval medical issues only getting nervous about the cost of Xarelto.\par August 28, 2019.  Patient returns for stress echocardiogram.  This time able to do a full 9 minutes without any symptoms.  No APCs or VPCs until recovery when there were occasional APCs.  No atrial fibrillation or atrial tachycardia.  No evidence of ischemia either by echo or by ECG.  Very mild AS and MR with normal LV and RV function.\par October 30, 2019.  Patient here in follow-up.  No arrhythmias, no symptoms, no medical issues, no change in medication.  EKG remains with sinus bradycardia at 56 with normal QRS and QT.  Patient planning on going to [place] in February.\par March 4, 2020.  Patient here in follow-up.  Put on a little weight while in [place].  No palpitations no symptoms.  Remains in sinus rhythm with a normal EKG.  Labs with Dr. Kidd included cholesterol 190, triglycerides 82, HDL 64, .  Hemoglobin A1c 5.5.\par August 11, 2020.  Patient here in follow-up.  No complaints.  Remains in sinus rhythm at 56 with normal QRS and QT.\par June 7, 2021.  First visit since last August.  Remains in sinus rhythm at 59 otherwise normal tracing with normal QRS and QT had labs in February including hemoglobin A1c of 5.4 that were within normal limits except cholesterol 185, HDL 57, , triglycerides 89.  No symptoms, no arrhythmias, playing [employer] ball in [place] without symptoms etc.  Was a little concerned that he lost a few more pounds although he does admit that they have cut out bread and cheese.  Last stress echo was August 2019.\par September 24, 2021.Patient returns for stress echocardiogram.  Still able to exercise for 9 minutes to a heart rate of 140 and a blood pressure of 188/60 although a little more fatigued.  No chest pain, no significant shortness of breath, no significant ST changes.  No echocardiographic evidence of ischemia.  No atrial arrhythmias and only occasional APCs in recovery no VPCs.  Baseline echo with chordal SULY and a prominent septum and slightly hyperdynamic but no significant LVOT gradient.  Mild AS.  LVEF 65 to 75% with normal RVSP and normal RV systolic function.  No significant change from 2019.  Continue flecainide.  Follow-up in December.\par December 13, 2021.  Patient here in follow-up.  Remains in sinus bradycardia at 58 with an otherwise normal tracing and normal QRS and QT.  1 APC noted.  Good spirits, no complaints.  Not sure if he will be going to [place] or not.  Had his flu shot already.  Blood pressure initially a little high but did come down somewhat by the end and he did have a normal blood pressure response on his stress test in September.  Weight is about the same.\par April 14, 2022.  Patient here in follow-up.  Remains in sinus rhythm at 60 with normal QRS and QTc.  Had labs with Dr. Kidd on March 2.  Hemoglobin A1c 5.5.  Cholesterol 177, triglycerides 52, HDL 71, LDL 96.  Normal chemistries, normal LFTs, normal thyroid function.  No complaints, no episodes of palpitations, active exercising etc.  Weight has come down some.  No COVID issues and is fully vaccinated with 1 booster.\par November 2, 2022.  Patient returns in follow-up.  Remains in sinus rhythm at 56 with a normal EKG including normal QRS and QT.  Initial blood pressure was elevated but on repeat was coming down by the end.  No interval medical issues and no COVID issues.  Still on flecainide without any problem.\par May 10, 2023.  Patient here in follow-up.  No interval issues except an asymptomatic bout of COVID around Thanksgiving (his wife had COVID and he tested positive without symptoms).  No chest pain shortness of breath palpitations etc.  EKG is sinus rhythm with frequent APCs occasional atrial bigeminy and otherwise unremarkable.  His APCs are asymptomatic as well.  Initial blood pressure borderline but follow-up much better.  Last stress echo was September 2021.

## 2023-05-10 NOTE — PHYSICAL EXAM
[General Appearance - Well Developed] : well developed [Normal Appearance] : normal appearance [Well Groomed] : well groomed [General Appearance - Well Nourished] : well nourished [No Deformities] : no deformities [General Appearance - In No Acute Distress] : no acute distress [Normal Conjunctiva] : the conjunctiva exhibited no abnormalities [Eyelids - No Xanthelasma] : the eyelids demonstrated no xanthelasmas [Normal Oral Mucosa] : normal oral mucosa [No Oral Pallor] : no oral pallor [No Oral Cyanosis] : no oral cyanosis [Normal Jugular Venous A Waves Present] : normal jugular venous A waves present [Normal Jugular Venous V Waves Present] : normal jugular venous V waves present [No Jugular Venous Lehman A Waves] : no jugular venous lehman A waves [Exaggerated Use Of Accessory Muscles For Inspiration] : no accessory muscle use [Respiration, Rhythm And Depth] : normal respiratory rhythm and effort [Auscultation Breath Sounds / Voice Sounds] : lungs were clear to auscultation bilaterally [Heart Rate And Rhythm] : heart rate and rhythm were normal [Heart Sounds] : normal S1 and S2 [Murmurs] : no murmurs present [Abdomen Soft] : soft [Abdomen Tenderness] : non-tender [Abdomen Mass (___ Cm)] : no abdominal mass palpated [Abnormal Walk] : normal gait [Gait - Sufficient For Exercise Testing] : the gait was sufficient for exercise testing [Nail Clubbing] : no clubbing of the fingernails [Cyanosis, Localized] : no localized cyanosis [Petechial Hemorrhages (___cm)] : no petechial hemorrhages [Skin Color & Pigmentation] : normal skin color and pigmentation [No Venous Stasis] : no venous stasis [] : no rash [Skin Lesions] : no skin lesions [No Skin Ulcers] : no skin ulcer [No Xanthoma] : no  xanthoma was observed [Oriented To Time, Place, And Person] : oriented to person, place, and time [Affect] : the affect was normal [Mood] : the mood was normal [No Anxiety] : not feeling anxious [FreeTextEntry1] : No edema. Pulses 2+ bilaterally symmetrical including pedal

## 2023-05-11 LAB
ALBUMIN SERPL ELPH-MCNC: 4.3 G/DL
ALP BLD-CCNC: 66 U/L
ALT SERPL-CCNC: 14 U/L
ANION GAP SERPL CALC-SCNC: 13 MMOL/L
AST SERPL-CCNC: 17 U/L
BASOPHILS # BLD AUTO: 0.03 K/UL
BASOPHILS NFR BLD AUTO: 0.4 %
BILIRUB SERPL-MCNC: 0.4 MG/DL
BUN SERPL-MCNC: 18 MG/DL
CALCIUM SERPL-MCNC: 9.3 MG/DL
CHLORIDE SERPL-SCNC: 102 MMOL/L
CHOLEST SERPL-MCNC: 172 MG/DL
CO2 SERPL-SCNC: 24 MMOL/L
CREAT SERPL-MCNC: 0.98 MG/DL
EGFR: 81 ML/MIN/1.73M2
EOSINOPHIL # BLD AUTO: 0.09 K/UL
EOSINOPHIL NFR BLD AUTO: 1.1 %
ESTIMATED AVERAGE GLUCOSE: 111 MG/DL
GLUCOSE SERPL-MCNC: 93 MG/DL
HBA1C MFR BLD HPLC: 5.5 %
HCT VFR BLD CALC: 36.1 %
HDLC SERPL-MCNC: 69 MG/DL
HGB BLD-MCNC: 11.7 G/DL
IMM GRANULOCYTES NFR BLD AUTO: 0.2 %
LDLC SERPL CALC-MCNC: 89 MG/DL
LYMPHOCYTES # BLD AUTO: 1.78 K/UL
LYMPHOCYTES NFR BLD AUTO: 21.1 %
MAN DIFF?: NORMAL
MCHC RBC-ENTMCNC: 29.8 PG
MCHC RBC-ENTMCNC: 32.4 GM/DL
MCV RBC AUTO: 92.1 FL
MONOCYTES # BLD AUTO: 0.57 K/UL
MONOCYTES NFR BLD AUTO: 6.8 %
NEUTROPHILS # BLD AUTO: 5.94 K/UL
NEUTROPHILS NFR BLD AUTO: 70.4 %
NONHDLC SERPL-MCNC: 102 MG/DL
NT-PROBNP SERPL-MCNC: 62 PG/ML
PLATELET # BLD AUTO: 224 K/UL
POTASSIUM SERPL-SCNC: 4.5 MMOL/L
PROT SERPL-MCNC: 6.9 G/DL
PSA SERPL-MCNC: 3.02 NG/ML
RBC # BLD: 3.92 M/UL
RBC # FLD: 13.2 %
SODIUM SERPL-SCNC: 138 MMOL/L
TRIGL SERPL-MCNC: 65 MG/DL
TSH SERPL-ACNC: 1.69 UIU/ML
WBC # FLD AUTO: 8.43 K/UL

## 2023-05-12 LAB
FERRITIN SERPL-MCNC: 99 NG/ML
IRON SATN MFR SERPL: 24 %
IRON SERPL-MCNC: 72 UG/DL
TIBC SERPL-MCNC: 296 UG/DL
UIBC SERPL-MCNC: 224 UG/DL

## 2023-06-21 ENCOUNTER — LABORATORY RESULT (OUTPATIENT)
Age: 73
End: 2023-06-21

## 2023-06-21 ENCOUNTER — APPOINTMENT (OUTPATIENT)
Dept: INTERNAL MEDICINE | Facility: CLINIC | Age: 73
End: 2023-06-21
Payer: MEDICARE

## 2023-06-21 VITALS
BODY MASS INDEX: 26.07 KG/M2 | OXYGEN SATURATION: 97 % | HEIGHT: 68 IN | SYSTOLIC BLOOD PRESSURE: 136 MMHG | HEART RATE: 66 BPM | DIASTOLIC BLOOD PRESSURE: 70 MMHG | TEMPERATURE: 97.2 F | WEIGHT: 172 LBS

## 2023-06-21 DIAGNOSIS — Z00.00 ENCOUNTER FOR GENERAL ADULT MEDICAL EXAMINATION W/OUT ABNORMAL FINDINGS: ICD-10-CM

## 2023-06-21 PROCEDURE — G0439: CPT

## 2023-06-21 NOTE — ASSESSMENT
[FreeTextEntry1] : discussed w pt \par \par reviewed updated hx, current rx \par \par reviewed recent f/u w Dr Henderson and recent labs completed \par \par noted mild normocytic anemia with no Fe deficiency. some weight loss had been noted but has now stabilized. other labs wnl including PSA.\par will recommend Fe supplement daily and will plan to have him f/u for repeat labs in few months to monitor mild anemia . he feels well and remains active. colonoscopy was updated in 2022 w benign polyp , internal hemorrhoids \par \par weight is stable\par \par  cont to monitor symptoms of BPH , monitor PSA \par \par reviewed vaccines, [id number] vaccine discussed and administered today \par \par f/u w Dr Henderson as scheduled , cont anticoagulation\par \par RTO 3-4 months or earlier prn if any new concerns

## 2023-06-21 NOTE — PHYSICAL EXAM
[No Acute Distress] : no acute distress [Well Nourished] : well nourished [Well Developed] : well developed [Well-Appearing] : well-appearing [Normal Voice/Communication] : normal voice/communication [Normal Sclera/Conjunctiva] : normal sclera/conjunctiva [PERRL] : pupils equal round and reactive to light [Normal Outer Ear/Nose] : the outer ears and nose were normal in appearance [Normal Oropharynx] : the oropharynx was normal [Normal TMs] : both tympanic membranes were normal [No JVD] : no jugular venous distention [Supple] : supple [No Lymphadenopathy] : no lymphadenopathy [Thyroid Normal, No Nodules] : the thyroid was normal and there were no nodules present [No Respiratory Distress] : no respiratory distress  [Clear to Auscultation] : lungs were clear to auscultation bilaterally [No Accessory Muscle Use] : no accessory muscle use [Normal Rate] : normal rate  [Regular Rhythm] : with a regular rhythm [Normal S1, S2] : normal S1 and S2 [No Murmur] : no murmur heard [No Carotid Bruits] : no carotid bruits [No Abdominal Bruit] : a ~M bruit was not heard ~T in the abdomen [No Varicosities] : no varicosities [Pedal Pulses Present] : the pedal pulses are present [No Edema] : there was no peripheral edema [No Extremity Clubbing/Cyanosis] : no extremity clubbing/cyanosis [Soft] : abdomen soft [Non Tender] : non-tender [Non-distended] : non-distended [No Masses] : no abdominal mass palpated [No HSM] : no HSM [Normal Bowel Sounds] : normal bowel sounds [Normal Supraclavicular Nodes] : no supraclavicular lymphadenopathy [Normal Posterior Cervical Nodes] : no posterior cervical lymphadenopathy [Normal Anterior Cervical Nodes] : no anterior cervical lymphadenopathy [No Spinal Tenderness] : no spinal tenderness [No Joint Swelling] : no joint swelling [Grossly Normal Strength/Tone] : grossly normal strength/tone [No Rash] : no rash [Normal Gait] : normal gait [Coordination Grossly Intact] : coordination grossly intact [No Focal Deficits] : no focal deficits [Speech Grossly Normal] : speech grossly normal [Memory Grossly Normal] : memory grossly normal [Normal Affect] : the affect was normal [Alert and Oriented x3] : oriented to person, place, and time [Normal Mood] : the mood was normal [Normal Insight/Judgement] : insight and judgment were intact

## 2023-06-21 NOTE — HEALTH RISK ASSESSMENT
[No] : In the past 12 months have you used drugs other than those required for medical reasons? No [Patient reported colonoscopy was normal] : Patient reported colonoscopy was normal [None] : None [Retired] : retired [] :  [Fully functional (bathing, dressing, toileting, transferring, walking, feeding)] : Fully functional (bathing, dressing, toileting, transferring, walking, feeding) [Fully functional (using the telephone, shopping, preparing meals, housekeeping, doing laundry, using] : Fully functional and needs no help or supervision to perform IADLs (using the telephone, shopping, preparing meals, housekeeping, doing laundry, using transportation, managing medications and managing finances) [Never] : Never [ColonoscopyDate] : 12/17

## 2023-06-21 NOTE — HISTORY OF PRESENT ILLNESS
[de-identified] : 72 y/o man presents for annual physical exam. he feels well overall currently w no new concerns. \par \par recent labs completed w Dr Henderson last month\par noted mild normocytic anemia with no Fe deficiency\par colonoscopy updated in 12/22 w Dr Dutta [facility] - benign polyp noted \par \par afib rate/rhythm controlled on flecainide, following w Dr Henderson, continues on Xarelto for anticoagulation without problems , no new events, echo imaging and stress testing w no evidence of ischemia\par chronic BPH symptoms w nocturia on terazosin for years, symptoms stable , PSA wnl \par

## 2023-06-26 LAB
APPEARANCE: CLEAR
BACTERIA UR CULT: NORMAL
BILIRUBIN URINE: NEGATIVE
BLOOD URINE: NEGATIVE
COLOR: YELLOW
GLUCOSE QUALITATIVE U: NEGATIVE MG/DL
KETONES URINE: NEGATIVE MG/DL
LEUKOCYTE ESTERASE URINE: ABNORMAL
NITRITE URINE: NEGATIVE
PH URINE: 7.5
PROTEIN URINE: NEGATIVE MG/DL
SPECIFIC GRAVITY URINE: 1.02
UROBILINOGEN URINE: 1 MG/DL

## 2023-07-18 ENCOUNTER — RX RENEWAL (OUTPATIENT)
Age: 73
End: 2023-07-18

## 2023-07-18 RX ORDER — RIVAROXABAN 20 MG/1
20 TABLET, FILM COATED ORAL
Qty: 30 | Refills: 9 | Status: ACTIVE | COMMUNITY
Start: 2022-04-08 | End: 1900-01-01

## 2023-11-15 ENCOUNTER — APPOINTMENT (OUTPATIENT)
Dept: CARDIOLOGY | Facility: CLINIC | Age: 73
End: 2023-11-15
Payer: MEDICARE

## 2023-11-15 PROCEDURE — 93015 CV STRESS TEST SUPVJ I&R: CPT

## 2023-11-15 PROCEDURE — 93306 TTE W/DOPPLER COMPLETE: CPT

## 2023-12-06 ENCOUNTER — NON-APPOINTMENT (OUTPATIENT)
Age: 73
End: 2023-12-06

## 2023-12-06 ENCOUNTER — APPOINTMENT (OUTPATIENT)
Dept: CARDIOLOGY | Facility: CLINIC | Age: 73
End: 2023-12-06
Payer: MEDICARE

## 2023-12-06 VITALS
HEIGHT: 68 IN | DIASTOLIC BLOOD PRESSURE: 75 MMHG | BODY MASS INDEX: 26.52 KG/M2 | HEART RATE: 65 BPM | OXYGEN SATURATION: 100 % | WEIGHT: 175 LBS | SYSTOLIC BLOOD PRESSURE: 145 MMHG

## 2023-12-06 DIAGNOSIS — I34.0 NONRHEUMATIC MITRAL (VALVE) INSUFFICIENCY: ICD-10-CM

## 2023-12-06 DIAGNOSIS — N40.0 BENIGN PROSTATIC HYPERPLASIA WITHOUT LOWER URINARY TRACT SYMPMS: ICD-10-CM

## 2023-12-06 PROCEDURE — 99214 OFFICE O/P EST MOD 30 MIN: CPT | Mod: 25

## 2023-12-06 PROCEDURE — 93000 ELECTROCARDIOGRAM COMPLETE: CPT

## 2023-12-07 LAB
ALBUMIN SERPL ELPH-MCNC: 4.5 G/DL
ALP BLD-CCNC: 77 U/L
ALT SERPL-CCNC: 19 U/L
ANION GAP SERPL CALC-SCNC: 9 MMOL/L
AST SERPL-CCNC: 17 U/L
BILIRUB SERPL-MCNC: 0.3 MG/DL
BUN SERPL-MCNC: 16 MG/DL
CALCIUM SERPL-MCNC: 9.5 MG/DL
CHLORIDE SERPL-SCNC: 99 MMOL/L
CHOLEST SERPL-MCNC: 198 MG/DL
CO2 SERPL-SCNC: 30 MMOL/L
CREAT SERPL-MCNC: 1.06 MG/DL
EGFR: 74 ML/MIN/1.73M2
ESTIMATED AVERAGE GLUCOSE: 114 MG/DL
GLUCOSE SERPL-MCNC: 64 MG/DL
HBA1C MFR BLD HPLC: 5.6 %
HCT VFR BLD CALC: 39.8 %
HDLC SERPL-MCNC: 68 MG/DL
HGB BLD-MCNC: 13 G/DL
LDLC SERPL CALC-MCNC: 111 MG/DL
MCHC RBC-ENTMCNC: 30.3 PG
MCHC RBC-ENTMCNC: 32.7 GM/DL
MCV RBC AUTO: 92.8 FL
NONHDLC SERPL-MCNC: 129 MG/DL
PLATELET # BLD AUTO: 266 K/UL
POTASSIUM SERPL-SCNC: 4.5 MMOL/L
PROT SERPL-MCNC: 7.3 G/DL
RBC # BLD: 4.29 M/UL
RBC # FLD: 13.1 %
SODIUM SERPL-SCNC: 139 MMOL/L
TRIGL SERPL-MCNC: 106 MG/DL
WBC # FLD AUTO: 7.8 K/UL

## 2023-12-11 ENCOUNTER — APPOINTMENT (OUTPATIENT)
Dept: CARDIOLOGY | Facility: CLINIC | Age: 73
End: 2023-12-11

## 2024-01-07 ENCOUNTER — RX RENEWAL (OUTPATIENT)
Age: 74
End: 2024-01-07

## 2024-01-22 ENCOUNTER — RX RENEWAL (OUTPATIENT)
Age: 74
End: 2024-01-22

## 2024-01-22 RX ORDER — FLECAINIDE ACETATE 50 MG/1
50 TABLET ORAL
Qty: 180 | Refills: 3 | Status: ACTIVE | COMMUNITY
Start: 2017-08-18 | End: 1900-01-01

## 2024-06-20 ENCOUNTER — APPOINTMENT (OUTPATIENT)
Dept: CARDIOLOGY | Facility: CLINIC | Age: 74
End: 2024-06-20
Payer: MEDICARE

## 2024-06-20 ENCOUNTER — NON-APPOINTMENT (OUTPATIENT)
Age: 74
End: 2024-06-20

## 2024-06-20 VITALS
OXYGEN SATURATION: 97 % | HEART RATE: 59 BPM | SYSTOLIC BLOOD PRESSURE: 116 MMHG | BODY MASS INDEX: 26.91 KG/M2 | WEIGHT: 177 LBS | DIASTOLIC BLOOD PRESSURE: 70 MMHG

## 2024-06-20 DIAGNOSIS — E78.00 PURE HYPERCHOLESTEROLEMIA, UNSPECIFIED: ICD-10-CM

## 2024-06-20 DIAGNOSIS — Z86.79 PERSONAL HISTORY OF OTHER DISEASES OF THE CIRCULATORY SYSTEM: ICD-10-CM

## 2024-06-20 DIAGNOSIS — I35.0 NONRHEUMATIC AORTIC (VALVE) STENOSIS: ICD-10-CM

## 2024-06-20 DIAGNOSIS — D64.9 ANEMIA, UNSPECIFIED: ICD-10-CM

## 2024-06-20 PROCEDURE — 99214 OFFICE O/P EST MOD 30 MIN: CPT

## 2024-06-20 PROCEDURE — 93000 ELECTROCARDIOGRAM COMPLETE: CPT

## 2024-06-20 PROCEDURE — G2211 COMPLEX E/M VISIT ADD ON: CPT

## 2024-06-20 NOTE — REVIEW OF SYSTEMS
[Negative] : Heme/Lymph [Weight Gain (___ Lbs)] : [unfilled] ~Ulb weight gain [Feeling Fatigued] : not feeling fatigued [Weight Loss (___ Lbs)] : no recent weight loss

## 2024-06-20 NOTE — HISTORY OF PRESENT ILLNESS
[FreeTextEntry1] : November 20, 2014. Patient has been pretty healthy for most of his life. For the past year or so he notes that if he exerts himself he gets short of breath especially if strenuous. He feels like he has to stop but he does not have any chest pain. If he is walking flat he does fine but any incline or anything heavy like shoveling causes shortness of breath and he has to stop. His risk factor for coronary artery disease seems to be a family history with a father who had a stent and probably bypass surgery. His hemoglobin A1c recently was 5.8. His lipids a year ago had mildly elevated LDL and HDL in the 50s. No hypertension and no cigarette smoking. Past medical history is otherwise unremarkable. He has had no surgeries and his only hospitalization was in college for either hepatitis or mononucleosis. His wife mentions that he does snore a lot but she does not describe apneic periods. He has very mild GERD. He has never had a stress test, pulmonary function tests etc. December 9, 2014. Patient return for a stress echocardiogram. Exercised for 8 minutes. The target heart rate of 150 for no chest pain or significant shortness of breath. No ST changes. Echocardiogram totally normal with no evidence of ischemia. July 21, 2017. First visit in almost 3 years. Patient has been doing well in the interim continuing to have the same symptom of occasional shortness of breath when he is walking, stops and it goes away, and then he can continue. No exertional chest pain. No change in intensity or frequency compared with 3 years ago, when he had a normal stress echo. He was scheduled for routine preop for cataract surgery and was found to be in atrial fibrillation yesterday. No symptoms of palpitations or any change in aerobic capacity. Rate seems to be controlled without medication. He is here now for evaluation of this new problem. August 18, 2017. Patient returns for followup along with stress test and echocardiogram. His echocardiogram is unremarkable except for mild MR. Normal LV and RV function. His plain stress test had no ischemia, but very frequent APCs and short bursts of 4-6 beat atrial tachycardia in recovery, but no atrial fibrillation. The patient Is back in sinus rhythm. His atrial fibrillation is mostly from conduction disease with maybe some component from his MR given that he has normal LV function and no ischemia. We have multiple choices. Given his underlying conduction disease he may have more problems with sotalol then with flecainide, so we will try flecainide at only 50 mg q.12 h. Long discussion about the pros and cons of eventually coming off anticoagulation. He will followup in one month and we will reevaluate. September 26, 2017. Patient returns now on flecainide 50 mg q.12 h. Is in sinus bradycardia at 56 with an otherwise normal ECG. He feels fine, with no complaints. December 1, 2017. Patient is here in followup. He feels wonderful without complaints. Is in sinus rhythm at 63, with a normal QRS, and QT, and no side effects. He will be seeing Dr. Smalls that next week for a checkup and a flu shot June 1, 2018. Patient returns in followup. Remains in sinus bradycardia at 52. Tolerating the flecainide. Tolerating the Xarelto with no complaints. December 13, 2018. Patient returns in followup. He remains in sinus bradycardia at 56 with an otherwise normal. EKG. Will be retiring soon and has a trip planned to [place] in February. No symptoms. No change in medication. No complaints. April 25, 2019. Patient here in followup. Remains in sinus rhythm at 57 with acceptable QRS and QT. Had a great trip to [place]. Is totally relaxed and feels wonderful. No interval medical issues only getting nervous about the cost of Xarelto. August 28, 2019.  Patient returns for stress echocardiogram.  This time able to do a full 9 minutes without any symptoms.  No APCs or VPCs until recovery when there were occasional APCs.  No atrial fibrillation or atrial tachycardia.  No evidence of ischemia either by echo or by ECG.  Very mild AS and MR with normal LV and RV function. October 30, 2019.  Patient here in follow-up.  No arrhythmias, no symptoms, no medical issues, no change in medication.  EKG remains with sinus bradycardia at 56 with normal QRS and QT.  Patient planning on going to [place] in February. March 4, 2020.  Patient here in follow-up.  Put on a little weight while in [place].  No palpitations no symptoms.  Remains in sinus rhythm with a normal EKG.  Labs with Dr. Kidd included cholesterol 190, triglycerides 82, HDL 64, .  Hemoglobin A1c 5.5. August 11, 2020.  Patient here in follow-up.  No complaints.  Remains in sinus rhythm at 56 with normal QRS and QT. June 7, 2021.  First visit since last August.  Remains in sinus rhythm at 59 otherwise normal tracing with normal QRS and QT had labs in February including hemoglobin A1c of 5.4 that were within normal limits except cholesterol 185, HDL 57, , triglycerides 89.  No symptoms, no arrhythmias, playing [employer] in [place] without symptoms etc.  Was a little concerned that he lost a few more pounds although he does admit that they have cut out bread and cheese.  Last stress echo was August 2019. September 24, 2021.Patient returns for stress echocardiogram.  Still able to exercise for 9 minutes to a heart rate of 140 and a blood pressure of 188/60 although a little more fatigued.  No chest pain, no significant shortness of breath, no significant ST changes.  No echocardiographic evidence of ischemia.  No atrial arrhythmias and only occasional APCs in recovery no VPCs.  Baseline echo with chordal SULY and a prominent septum and slightly hyperdynamic but no significant LVOT gradient.  Mild AS.  LVEF 65 to 75% with normal RVSP and normal RV systolic function.  No significant change from 2019.  Continue flecainide.  Follow-up in December. December 13, 2021.  Patient here in follow-up.  Remains in sinus bradycardia at 58 with an otherwise normal tracing and normal QRS and QT.  1 APC noted.  Good spirits, no complaints.  Not sure if he will be going to [place] or not.  Had his flu shot already.  Blood pressure initially a little high but did come down somewhat by the end and he did have a normal blood pressure response on his stress test in September.  Weight is about the same. April 14, 2022.  Patient here in follow-up.  Remains in sinus rhythm at 60 with normal QRS and QTc.  Had labs with Dr. Kidd on March 2.  Hemoglobin A1c 5.5.  Cholesterol 177, triglycerides 52, HDL 71, LDL 96.  Normal chemistries, normal LFTs, normal thyroid function.  No complaints, no episodes of palpitations, active exercising etc.  Weight has come down some.  No COVID issues and is fully vaccinated with 1 booster. November 2, 2022.  Patient returns in follow-up.  Remains in sinus rhythm at 56 with a normal EKG including normal QRS and QT.  Initial blood pressure was elevated but on repeat was coming down by the end.  No interval medical issues and no COVID issues.  Still on flecainide without any problem. May 10, 2023.  Patient here in follow-up.  No interval issues except an asymptomatic bout of COVID around Thanksgiving (his wife had COVID and he tested positive without symptoms).  No chest pain shortness of breath palpitations etc.  EKG is sinus rhythm with frequent APCs occasional atrial bigeminy and otherwise unremarkable.  His APCs are asymptomatic as well.  Initial blood pressure borderline but follow-up much better.  Last stress echo was September 2021. November 15, 2023.  Patient returns for stress test and echocardiogram.  He exercised for 9 minutes achieving a heart rate of 134 with a peak blood pressure of 198/64 (started at 134/60).  No chest pain.  No shortness of breath just some leg fatigue.  No ST changes.  Frequent APCs at baseline that resolved with exercise and then came back in recovery with some atrial bigeminy but no atrial fibrillation.  No ventricular arrhythmias.  His echocardiogram but normal LVEF of 71% with no wall motion abnormalities. He Had maybe mild concentric LVH. Trace AI, mild MR, trace TR with PAS 30.  Normal RV and right atrium as well. December 6, 2023.  Patient returns for regular follow-up visit.  I had been away when he had the stress test and the echo but I reviewed both of them and reviewed the results with the patient.  He feels well and has had no symptoms and no episodes of atrial fibrillation etc. June 20, 2024.  Patient here in follow-up, this time together with his wife who is a new patient.  He is doing fine without any interval episodes hospitals or urgent care etc.  No change in his medication.  Does put on a couple of pounds each time he comes back.  Had labs with Dr. Chidi Dutta not that long ago but unavailable at this point.

## 2024-06-20 NOTE — DISCUSSION/SUMMARY
[FreeTextEntry1] : No change in medication.  Try to obtain labs from Dr. Chidi Dutta from a few weeks ago.  If asymptomatic and stable follow-up in 6 months.  Watch weight and watch LDL cholesterol [EKG obtained to assist in diagnosis and management of assessed problem(s)] : EKG obtained to assist in diagnosis and management of assessed problem(s)

## 2024-06-20 NOTE — ASSESSMENT
[FreeTextEntry1] : 74-year-old man with minimally elevated glucose minimally elevated LDL in the past. Developed dyspnea on exertion In 2014, and had a totally normal stress echocardiogram back then. The symptom has persisted, but not progressed. Otherwise, he has been healthy and doing well. At routine preop for cataract surgery in July, 2017 he was found to be in atrial fibrillation with good rate control. No history of an episode of palpitations with sudden onset, etc. No change in his exercise capacity or stamina. EKG is otherwise normal. Last EKG was in November of 2016, and was sinus rhythm with some APCs. Physical exam unremarkable and no signs of congestive heart failure. He had no problems with the cataract surgery, and he returned for a stress test and an echocardiogram 8/2017. Echo was normal other than mild MR, but normal left atrial size. Normal LV and RV function. Stress test showed him back in sinus rhythm with no ischemia, but very frequent APCs and short bursts of atrial tachycardia in the recovery period. In terms of the atrial fibrillation, there is probably some mild degree of conduction disease as he does not require rate control. His CHADS2-VASC score seemed to be 1 just for his age of over 65. Without bleeding contraindications I favor anticoagulation and I discussed this with the patient and he agreed to go on Xarelto 20 mg daily. No need for aspirin therapy. Normal LV function, and no ischemia, and with probably mild underlying conduction disease. I elected to go with flecainide 50 mg q.12 h. instead of sotalol. He returns again in followup and has no side effects, tolerating the flecainide, and is in sinus rhythm at 56 with a normal QRS and QT. Long discussion with the patient about whether or not to eventually stop anticoagulation last visit; now that he is 70 his CHADS2-vascular score has gone up to 2. He has no problems staying on Xarelto. Had stress echocardiogram in August, 2019 without ischemia or proarrhythmia and with normal LV function. Aortic systolic and diastolic murmur both mild audible today. Reviewed diet both for weight loss and again further attempt to get LDL below 100. Returned from [place]. Had lost 7 pounds but is a little worried that it is more than he should have so labs will be rechecked although in February labs were okay with Dr. Kidd. Since then his weight has leveled off. He returns today and has lost another 6 pounds but his recent labs were within normal limits. No symptoms in general and no atrial fibrillation. Stress echo in September, 2021 unremarkable as mentioned above. EKG today with sinus rhythm at 60 and otherwise okay. Recent LDL in June 96 with HDL 72.  Patient was here May 10, 2023 and continued to do well on his current medication. Very frequent APCs with often atrial bigeminy and trigeminy but totally asymptomatic. Blood pressure initially high but improved on repeat. Last stress echo September 2021 so in order to remain on flecainide without a concern for proarrhythmia he will come back on his next visit for a stress test and an echocardiogram. I believe he did have very mild AAS on the last 1. He remains on Xarelto 20 mg; previously I discussed with him the recent study that perhaps Eliquis is both safer and more effective than Xarelto but he was not interested in changing. He is just hoping one of them become generic and cheaper. Patient returned December 6, 2023 have to have a stress test and echo on November 15. The stress and echo were unremarkable as mentioned above with no evidence of proarrhythmia and he can remain on the flecainide. His exam and EKG today were acceptable with sinus rhythm at 66 and otherwise normal tracing including normal QRS and QT. No symptoms to suggest atrial fibrillation or proarrhythmia. Labs will be sent.  Patient returns today June 20, 2024 along with his wife who became a new patient.  He is doing fine without any interval issues or problems.  EKG is sinus rhythm with normal QRS and QT and unchanged.  Had his stress test and echo last November with no problems so we continue the flecainide.  He remains on Xarelto; we discussed the mild benefits of Eliquis over Xarelto head-to-head but he was okay staying with the Xarelto.  He had labs from Dr. Chidi Dutta so we will obtain a copy; if it does not have what we need he will be coming back when his wife has a stress test and echo and we can draw blood then.  Otherwise he will return in 6 months.

## 2024-06-20 NOTE — PHYSICAL EXAM
[General Appearance - Well Developed] : well developed [Normal Appearance] : normal appearance [Well Groomed] : well groomed [General Appearance - Well Nourished] : well nourished [No Deformities] : no deformities [General Appearance - In No Acute Distress] : no acute distress [Normal Conjunctiva] : the conjunctiva exhibited no abnormalities [Eyelids - No Xanthelasma] : the eyelids demonstrated no xanthelasmas [Normal Oral Mucosa] : normal oral mucosa [No Oral Pallor] : no oral pallor [No Oral Cyanosis] : no oral cyanosis [Normal Jugular Venous A Waves Present] : normal jugular venous A waves present [Normal Jugular Venous V Waves Present] : normal jugular venous V waves present [No Jugular Venous Lehman A Waves] : no jugular venous lehman A waves [Respiration, Rhythm And Depth] : normal respiratory rhythm and effort [Exaggerated Use Of Accessory Muscles For Inspiration] : no accessory muscle use [Auscultation Breath Sounds / Voice Sounds] : lungs were clear to auscultation bilaterally [Heart Rate And Rhythm] : heart rate and rhythm were normal [Heart Sounds] : normal S1 and S2 [Murmurs] : no murmurs present [Abdomen Soft] : soft [Abdomen Tenderness] : non-tender [Abdomen Mass (___ Cm)] : no abdominal mass palpated [Abnormal Walk] : normal gait [Gait - Sufficient For Exercise Testing] : the gait was sufficient for exercise testing [Nail Clubbing] : no clubbing of the fingernails [Cyanosis, Localized] : no localized cyanosis [Petechial Hemorrhages (___cm)] : no petechial hemorrhages [Skin Color & Pigmentation] : normal skin color and pigmentation [] : no rash [No Venous Stasis] : no venous stasis [Skin Lesions] : no skin lesions [No Skin Ulcers] : no skin ulcer [No Xanthoma] : no  xanthoma was observed [Oriented To Time, Place, And Person] : oriented to person, place, and time [Affect] : the affect was normal [Mood] : the mood was normal [No Anxiety] : not feeling anxious [FreeTextEntry1] : Very tan from [place] but makes sure to go to the dermatologist and be checked

## 2024-06-26 ENCOUNTER — NON-APPOINTMENT (OUTPATIENT)
Age: 74
End: 2024-06-26

## 2024-08-27 ENCOUNTER — RX RENEWAL (OUTPATIENT)
Age: 74
End: 2024-08-27

## 2024-12-19 ENCOUNTER — NON-APPOINTMENT (OUTPATIENT)
Age: 74
End: 2024-12-19

## 2024-12-19 ENCOUNTER — APPOINTMENT (OUTPATIENT)
Dept: CARDIOLOGY | Facility: CLINIC | Age: 74
End: 2024-12-19
Payer: MEDICARE

## 2024-12-19 VITALS
HEART RATE: 60 BPM | WEIGHT: 184 LBS | DIASTOLIC BLOOD PRESSURE: 60 MMHG | BODY MASS INDEX: 27.98 KG/M2 | OXYGEN SATURATION: 98 % | SYSTOLIC BLOOD PRESSURE: 124 MMHG

## 2024-12-19 DIAGNOSIS — I34.0 NONRHEUMATIC MITRAL (VALVE) INSUFFICIENCY: ICD-10-CM

## 2024-12-19 DIAGNOSIS — Z86.79 PERSONAL HISTORY OF OTHER DISEASES OF THE CIRCULATORY SYSTEM: ICD-10-CM

## 2024-12-19 DIAGNOSIS — E78.00 PURE HYPERCHOLESTEROLEMIA, UNSPECIFIED: ICD-10-CM

## 2024-12-19 DIAGNOSIS — I35.0 NONRHEUMATIC AORTIC (VALVE) STENOSIS: ICD-10-CM

## 2024-12-19 PROCEDURE — 93000 ELECTROCARDIOGRAM COMPLETE: CPT

## 2024-12-19 PROCEDURE — 99214 OFFICE O/P EST MOD 30 MIN: CPT

## 2024-12-19 PROCEDURE — G2211 COMPLEX E/M VISIT ADD ON: CPT

## 2024-12-23 LAB
ALBUMIN SERPL ELPH-MCNC: 4.3 G/DL
ALP BLD-CCNC: 69 U/L
ALT SERPL-CCNC: 17 U/L
ANION GAP SERPL CALC-SCNC: 12 MMOL/L
AST SERPL-CCNC: 18 U/L
BASOPHILS # BLD AUTO: 0.03 K/UL
BASOPHILS NFR BLD AUTO: 0.6 %
BILIRUB SERPL-MCNC: 0.4 MG/DL
BUN SERPL-MCNC: 17 MG/DL
CALCIUM SERPL-MCNC: 9.7 MG/DL
CHLORIDE SERPL-SCNC: 99 MMOL/L
CHOLEST SERPL-MCNC: 183 MG/DL
CO2 SERPL-SCNC: 26 MMOL/L
CREAT SERPL-MCNC: 0.92 MG/DL
EGFR: 87 ML/MIN/1.73M2
EOSINOPHIL # BLD AUTO: 0.28 K/UL
EOSINOPHIL NFR BLD AUTO: 5.5 %
ESTIMATED AVERAGE GLUCOSE: 108 MG/DL
GLUCOSE SERPL-MCNC: 77 MG/DL
HBA1C MFR BLD HPLC: 5.4 %
HCT VFR BLD CALC: 37.2 %
HDLC SERPL-MCNC: 63 MG/DL
HGB BLD-MCNC: 12 G/DL
IMM GRANULOCYTES NFR BLD AUTO: 0.2 %
LDLC SERPL CALC-MCNC: 111 MG/DL
LYMPHOCYTES # BLD AUTO: 1.79 K/UL
LYMPHOCYTES NFR BLD AUTO: 35.2 %
MAN DIFF?: NORMAL
MCHC RBC-ENTMCNC: 29.9 PG
MCHC RBC-ENTMCNC: 32.3 G/DL
MCV RBC AUTO: 92.5 FL
MONOCYTES # BLD AUTO: 0.59 K/UL
MONOCYTES NFR BLD AUTO: 11.6 %
NEUTROPHILS # BLD AUTO: 2.39 K/UL
NEUTROPHILS NFR BLD AUTO: 46.9 %
NONHDLC SERPL-MCNC: 120 MG/DL
NT-PROBNP SERPL-MCNC: 79 PG/ML
PLATELET # BLD AUTO: 219 K/UL
POTASSIUM SERPL-SCNC: 4.6 MMOL/L
PROT SERPL-MCNC: 6.9 G/DL
RBC # BLD: 4.02 M/UL
RBC # FLD: 12.9 %
SODIUM SERPL-SCNC: 138 MMOL/L
TRIGL SERPL-MCNC: 48 MG/DL
TSH SERPL-ACNC: 2.16 UIU/ML
WBC # FLD AUTO: 5.09 K/UL

## 2025-01-27 ENCOUNTER — RX RENEWAL (OUTPATIENT)
Age: 75
End: 2025-01-27

## 2025-06-17 ENCOUNTER — APPOINTMENT (OUTPATIENT)
Dept: CARDIOLOGY | Facility: CLINIC | Age: 75
End: 2025-06-17
Payer: MEDICARE

## 2025-06-17 VITALS
OXYGEN SATURATION: 99 % | DIASTOLIC BLOOD PRESSURE: 60 MMHG | HEART RATE: 51 BPM | SYSTOLIC BLOOD PRESSURE: 130 MMHG | WEIGHT: 184 LBS | BODY MASS INDEX: 27.98 KG/M2

## 2025-06-17 PROCEDURE — G2211 COMPLEX E/M VISIT ADD ON: CPT

## 2025-06-17 PROCEDURE — 99214 OFFICE O/P EST MOD 30 MIN: CPT

## 2025-06-17 PROCEDURE — 93000 ELECTROCARDIOGRAM COMPLETE: CPT

## 2025-06-18 LAB
ALBUMIN SERPL ELPH-MCNC: 4.3 G/DL
ALP BLD-CCNC: 75 U/L
ALT SERPL-CCNC: 19 U/L
ANION GAP SERPL CALC-SCNC: 13 MMOL/L
AST SERPL-CCNC: 19 U/L
BASOPHILS # BLD AUTO: 0.05 K/UL
BASOPHILS NFR BLD AUTO: 0.9 %
BILIRUB SERPL-MCNC: 0.4 MG/DL
BUN SERPL-MCNC: 14 MG/DL
CALCIUM SERPL-MCNC: 9.4 MG/DL
CHLORIDE SERPL-SCNC: 101 MMOL/L
CHOLEST SERPL-MCNC: 198 MG/DL
CO2 SERPL-SCNC: 23 MMOL/L
CREAT SERPL-MCNC: 0.98 MG/DL
EGFRCR SERPLBLD CKD-EPI 2021: 80 ML/MIN/1.73M2
EOSINOPHIL # BLD AUTO: 0.33 K/UL
EOSINOPHIL NFR BLD AUTO: 5.9 %
ESTIMATED AVERAGE GLUCOSE: 117 MG/DL
GLUCOSE SERPL-MCNC: 69 MG/DL
HBA1C MFR BLD HPLC: 5.7 %
HCT VFR BLD CALC: 40.7 %
HDLC SERPL-MCNC: 62 MG/DL
HGB BLD-MCNC: 12.5 G/DL
IMM GRANULOCYTES NFR BLD AUTO: 0.4 %
LDLC SERPL-MCNC: 120 MG/DL
LYMPHOCYTES # BLD AUTO: 1.92 K/UL
LYMPHOCYTES NFR BLD AUTO: 34.4 %
MAN DIFF?: NORMAL
MCHC RBC-ENTMCNC: 29.9 PG
MCHC RBC-ENTMCNC: 30.7 G/DL
MCV RBC AUTO: 97.4 FL
MONOCYTES # BLD AUTO: 0.58 K/UL
MONOCYTES NFR BLD AUTO: 10.4 %
NEUTROPHILS # BLD AUTO: 2.68 K/UL
NEUTROPHILS NFR BLD AUTO: 48 %
NONHDLC SERPL-MCNC: 136 MG/DL
NT-PROBNP SERPL-MCNC: 101 PG/ML
PLATELET # BLD AUTO: 235 K/UL
POTASSIUM SERPL-SCNC: 4.6 MMOL/L
PROT SERPL-MCNC: 7.1 G/DL
RBC # BLD: 4.18 M/UL
RBC # FLD: 13.4 %
SODIUM SERPL-SCNC: 138 MMOL/L
TRIGL SERPL-MCNC: 89 MG/DL
TSH SERPL-ACNC: 2.4 UIU/ML
WBC # FLD AUTO: 5.58 K/UL

## 2025-08-29 ENCOUNTER — RX RENEWAL (OUTPATIENT)
Age: 75
End: 2025-08-29